# Patient Record
Sex: FEMALE | Race: WHITE | Employment: OTHER | URBAN - METROPOLITAN AREA
[De-identification: names, ages, dates, MRNs, and addresses within clinical notes are randomized per-mention and may not be internally consistent; named-entity substitution may affect disease eponyms.]

---

## 2017-01-05 ENCOUNTER — OFFICE VISIT (OUTPATIENT)
Dept: INTERNAL MEDICINE CLINIC | Age: 72
End: 2017-01-05

## 2017-01-05 VITALS — HEART RATE: 68 BPM | SYSTOLIC BLOOD PRESSURE: 124 MMHG | DIASTOLIC BLOOD PRESSURE: 70 MMHG

## 2017-01-05 DIAGNOSIS — E78.2 MIXED HYPERLIPIDEMIA: ICD-10-CM

## 2017-01-05 DIAGNOSIS — L40.9 PSORIASIS: Primary | ICD-10-CM

## 2017-01-05 DIAGNOSIS — R73.02 GLUCOSE INTOLERANCE (IMPAIRED GLUCOSE TOLERANCE): ICD-10-CM

## 2017-01-05 DIAGNOSIS — E55.9 VITAMIN D DEFICIENCY: ICD-10-CM

## 2017-01-05 PROCEDURE — 99213 OFFICE O/P EST LOW 20 MIN: CPT | Performed by: INTERNAL MEDICINE

## 2017-03-21 ENCOUNTER — TELEPHONE (OUTPATIENT)
Dept: INTERNAL MEDICINE CLINIC | Age: 72
End: 2017-03-21

## 2017-10-19 ENCOUNTER — TELEPHONE (OUTPATIENT)
Dept: INTERNAL MEDICINE CLINIC | Age: 72
End: 2017-10-19

## 2017-10-19 ENCOUNTER — OFFICE VISIT (OUTPATIENT)
Dept: FAMILY MEDICINE CLINIC | Age: 72
End: 2017-10-19

## 2017-10-19 VITALS
OXYGEN SATURATION: 98 % | TEMPERATURE: 99 F | BODY MASS INDEX: 23.75 KG/M2 | HEIGHT: 61 IN | DIASTOLIC BLOOD PRESSURE: 68 MMHG | SYSTOLIC BLOOD PRESSURE: 124 MMHG | WEIGHT: 125.8 LBS | HEART RATE: 82 BPM

## 2017-10-19 DIAGNOSIS — M54.2 NECK PAIN: ICD-10-CM

## 2017-10-19 DIAGNOSIS — B02.9 HERPES ZOSTER WITHOUT COMPLICATION: Primary | ICD-10-CM

## 2017-10-19 PROCEDURE — 99214 OFFICE O/P EST MOD 30 MIN: CPT | Performed by: FAMILY MEDICINE

## 2017-10-19 PROCEDURE — G8510 SCR DEP NEG, NO PLAN REQD: HCPCS | Performed by: FAMILY MEDICINE

## 2017-10-19 PROCEDURE — 3288F FALL RISK ASSESSMENT DOCD: CPT | Performed by: FAMILY MEDICINE

## 2017-10-19 RX ORDER — PREDNISONE 20 MG/1
20 TABLET ORAL 2 TIMES DAILY
Qty: 10 TABLET | Refills: 0 | Status: SHIPPED | OUTPATIENT
Start: 2017-10-19 | End: 2017-10-24

## 2017-10-19 RX ORDER — AZITHROMYCIN 250 MG/1
TABLET, FILM COATED ORAL
Qty: 1 PACKET | Refills: 0 | Status: SHIPPED | OUTPATIENT
Start: 2017-10-19 | End: 2018-01-16

## 2017-10-19 RX ORDER — VALACYCLOVIR HYDROCHLORIDE 1 G/1
1000 TABLET, FILM COATED ORAL 3 TIMES DAILY
Qty: 21 TABLET | Refills: 0 | Status: SHIPPED | OUTPATIENT
Start: 2017-10-19 | End: 2017-10-26

## 2017-10-19 ASSESSMENT — ENCOUNTER SYMPTOMS
DIARRHEA: 0
NAUSEA: 0
VOMITING: 0
ABDOMINAL PAIN: 0
BACK PAIN: 0
SCALP TENDERNESS: 1
SORE THROAT: 0
SINUS PRESSURE: 0
SHORTNESS OF BREATH: 0
EYE DISCHARGE: 0
COUGH: 0
BLOOD IN STOOL: 0

## 2017-10-19 ASSESSMENT — PATIENT HEALTH QUESTIONNAIRE - PHQ9
SUM OF ALL RESPONSES TO PHQ9 QUESTIONS 1 & 2: 0
SUM OF ALL RESPONSES TO PHQ QUESTIONS 1-9: 0
1. LITTLE INTEREST OR PLEASURE IN DOING THINGS: 0
2. FEELING DOWN, DEPRESSED OR HOPELESS: 0

## 2017-10-19 NOTE — PATIENT INSTRUCTIONS
shingles. Follow-up care is a key part of your treatment and safety. Be sure to make and go to all appointments, and call your doctor if you are having problems. It's also a good idea to know your test results and keep a list of the medicines you take. How can you care for yourself at home? · Be safe with medicines. Take your medicines exactly as prescribed. Call your doctor if you think you are having a problem with your medicine. Antiviral medicine helps you get better faster. · Try not to scratch or pick at the blisters. They will crust over and fall off on their own if you leave them alone. · Put cool, wet cloths on the area to relieve pain and itching. You can also use calamine lotion. Try not to use so much lotion that it cakes and is hard to get off. · Put cornstarch or baking soda on the sores to help dry them out so they heal faster. · Do not use thick ointment, such as petroleum jelly, on the sores. This will keep them from drying and healing. · To help remove loose crusts, soak them in tap water. This can help decrease oozing, and dry and soothe the skin. · Take an over-the-counter pain medicine, such as acetaminophen (Tylenol), ibuprofen (Advil, Motrin), or naproxen (Aleve). Read and follow all instructions on the label. · Avoid close contact with people until the blisters have healed. It is very important for you to avoid contact with anyone who has never had chickenpox or the chickenpox vaccine. Pregnant women, young babies, and anyone else who has a hard time fighting infection (such as someone with HIV, diabetes, or cancer) is especially at risk. When should you call for help? Call your doctor now or seek immediate medical care if:  · You have a new or higher fever. · You have a severe headache and a stiff neck. · You lose the ability to think clearly. · The rash spreads to your forehead, nose, eyes, or eyelids. · You have eye pain, or your vision gets worse.   · You have new pain in your face, or you cannot move the muscles in your face. · Blisters spread to new parts of your body. Watch closely for changes in your health, and be sure to contact your doctor if:  · The rash has not healed after 2 to 4 weeks. · You still have pain after the rash has healed. Where can you learn more? Go to [email or web address] org and sign in to your [facility] account. Ruben Pritchett in the [employer] box to learn more about \"Shingles: Care Instructions. \"     If you do not have an account, please click on the \"Sign Up Now\" link. Current as of: March 3, 2017  Content Version: 11.3  © 9351-2751 [employer]. Care instructions adapted under license by [facility] ([facility]). If you have questions about a medical condition or this instruction, always ask your healthcare professional. [address] any warranty or liability for your use of this information.

## 2017-10-19 NOTE — PROGRESS NOTES
Lady Coffey  1945  81 y.o. SUBJECT CORRIE:    Chief Complaint   Patient presents with    Fever    Headache     pain on right side of head, neck, and ear, tender to touch, warm, no drainage. X since yesterday. Has only taken Tylenol OTC     Patient with above. No known sick contacts. No  rash. Fever    This is a new problem. The current episode started yesterday. The problem occurs constantly. The maximum temperature noted was 102 to 102.9 F (102.3 ). Associated symptoms include headaches. Pertinent negatives include no abdominal pain, chest pain, congestion, coughing, diarrhea, ear pain, nausea, rash, sore throat, urinary pain or vomiting. She has tried acetaminophen for the symptoms. The treatment provided mild relief. Risk factors: no contaminated food, no contaminated water, no recent travel and no sick contacts    Headache    This is a new problem. The current episode started yesterday. The problem occurs constantly. The problem has been unchanged. The pain is located in the right unilateral region. The pain radiates to the right neck. The pain quality is not similar to prior headaches. The quality of the pain is described as aching and stabbing. The pain is moderate. Associated symptoms include a fever, neck pain (right side) and scalp tenderness. Pertinent negatives include no abdominal pain, back pain, coughing, dizziness, ear pain, nausea, sinus pressure, sore throat, vomiting or weakness. The symptoms are aggravated by activity. She has tried acetaminophen for the symptoms. The treatment provided mild relief. There is no history of migraine headaches or sinus disease. Review of Systems   Constitutional: Positive for fever. Negative for chills and fatigue. HENT: Negative for congestion, ear pain, sinus pressure and sore throat. Eyes: Negative for discharge and visual disturbance. Respiratory: Negative for cough and shortness of breath.     Cardiovascular: Negative for chest pain and leg swelling. Gastrointestinal: Negative for abdominal pain, blood in stool, diarrhea, nausea and vomiting. Endocrine: Negative for polydipsia. Genitourinary: Negative for dysuria, frequency and hematuria. Musculoskeletal: Positive for neck pain (right side). Negative for back pain and gait problem. Skin: Negative for rash. Allergic/Immunologic: Negative for environmental allergies and food allergies. Neurological: Positive for headaches. Negative for dizziness and weakness. Psychiatric/Behavioral: Negative for behavioral problems, sleep disturbance and suicidal ideas. The patient is not nervous/anxious. Past Medical, Family, and Social History have been reviewed today. OBJECTIVE:     /68   Pulse 82   Temp 99 °F (37.2 °C) (Oral)   Ht 5' 1\" (1.549 m)   Wt 125 lb 12.8 oz (57.1 kg)   SpO2 98%   Breastfeeding? No   BMI 23.77 kg/m²     Physical Exam   Constitutional: She is oriented to person, place, and time. She appears well-developed and well-nourished. HENT:   Head: Normocephalic and atraumatic. Right Ear: External ear normal.   Left Ear: External ear normal.   Nose: Nose normal. Right sinus exhibits no maxillary sinus tenderness and no frontal sinus tenderness. Left sinus exhibits no maxillary sinus tenderness and no frontal sinus tenderness. Mouth/Throat: Oropharynx is clear and moist.   Erythema in postauricular area. No parotid gland tenderness     Eyes: Conjunctivae and EOM are normal. Pupils are equal, round, and reactive to light. No scleral icterus. Neck: Normal range of motion. Neck supple. Cardiovascular: Normal rate, regular rhythm, normal heart sounds and intact distal pulses. Exam reveals no gallop and no friction rub. No murmur heard. Pulmonary/Chest: Effort normal and breath sounds normal. No respiratory distress. She has no wheezes. She has no rales. Abdominal: Soft. Bowel sounds are normal. She exhibits no distension and no mass.  There is no tenderness. There is no rebound and no guarding. Musculoskeletal: Normal range of motion. Neurological: She is alert and oriented to person, place, and time. Skin: Skin is warm and dry. No rash noted. Psychiatric: She has a normal mood and affect. Her behavior is normal. Judgment and thought content normal.         ASSESSMENT/ PLAN:    1. Herpes zoster without complication  Concern with only right sided pain that this could be early presentation of shingles considering fever as well. Will cover with Valtrex since she will be traveling out of town this weeknd  - valACYclovir (VALTREX) 1 g tablet; Take 1 tablet by mouth 3 times daily for 7 days  Dispense: 21 tablet; Refill: 0    2. Neck pain  Will cover for possible bacterial cause as well with Z blade and Prednisone to help with pain and swelling behind right ear. Will call in a couple of days to see how she is doing.   - azithromycin (ZITHROMAX) 250 MG tablet; Take 2 tabs (500 mg) on Day 1, and take 1 tab (250 mg) on days 2 through 5. Dispense: 1 packet; Refill: 0  - predniSONE (DELTASONE) 20 MG tablet; Take 1 tablet by mouth 2 times daily for 5 days  Dispense: 10 tablet; Refill: 0        No orders of the defined types were placed in this encounter. No Follow-up on file.

## 2017-10-19 NOTE — TELEPHONE ENCOUNTER
Patient called c/o fever chills and ear pain. Dr. Ayesha Mckinney is going out of town and has no appts he would like for her to call Dr. Valdovinos Ismay to get an appt. She was given the number and told to make an appt.

## 2017-10-23 ENCOUNTER — TELEPHONE (OUTPATIENT)
Dept: FAMILY MEDICINE CLINIC | Age: 72
End: 2017-10-23

## 2018-01-16 ENCOUNTER — OFFICE VISIT (OUTPATIENT)
Dept: INTERNAL MEDICINE CLINIC | Age: 73
End: 2018-01-16

## 2018-01-16 VITALS
BODY MASS INDEX: 23.11 KG/M2 | HEIGHT: 61 IN | WEIGHT: 122.4 LBS | SYSTOLIC BLOOD PRESSURE: 120 MMHG | DIASTOLIC BLOOD PRESSURE: 70 MMHG | HEART RATE: 80 BPM

## 2018-01-16 DIAGNOSIS — R00.2 PALPITATIONS: ICD-10-CM

## 2018-01-16 DIAGNOSIS — E03.4 HYPOTHYROIDISM DUE TO ACQUIRED ATROPHY OF THYROID: ICD-10-CM

## 2018-01-16 DIAGNOSIS — E78.2 MIXED HYPERLIPIDEMIA: ICD-10-CM

## 2018-01-16 DIAGNOSIS — R73.02 GLUCOSE INTOLERANCE (IMPAIRED GLUCOSE TOLERANCE): ICD-10-CM

## 2018-01-16 DIAGNOSIS — Z23 NEED FOR TDAP VACCINATION: ICD-10-CM

## 2018-01-16 DIAGNOSIS — D50.9 IRON DEFICIENCY ANEMIA, UNSPECIFIED IRON DEFICIENCY ANEMIA TYPE: ICD-10-CM
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
## 2018-01-16 DIAGNOSIS — E55.9 VITAMIN D DEFICIENCY: ICD-10-CM

## 2018-01-16 DIAGNOSIS — F41.1 GENERALIZED ANXIETY DISORDER: Primary | ICD-10-CM

## 2018-01-16 LAB
A/G RATIO: 1.9 (ref 1.1–2.2)
ALBUMIN SERPL-MCNC: 4.6 G/DL (ref 3.4–5)
ALP BLD-CCNC: 44 U/L (ref 40–129)
ALT SERPL-CCNC: 17 U/L (ref 10–40)
ANION GAP SERPL CALCULATED.3IONS-SCNC: 14 MMOL/L (ref 3–16)
AST SERPL-CCNC: 28 U/L (ref 15–37)
BASOPHILS ABSOLUTE: 0 K/UL (ref 0–0.2)
BASOPHILS RELATIVE PERCENT: 0.7 %
BILIRUB SERPL-MCNC: 0.4 MG/DL (ref 0–1)
BILIRUBIN URINE: NEGATIVE
BLOOD, URINE: NEGATIVE
BUN BLDV-MCNC: 14 MG/DL (ref 7–20)
CALCIUM SERPL-MCNC: 9.2 MG/DL (ref 8.3–10.6)
CHLORIDE BLD-SCNC: 102 MMOL/L (ref 99–110)
CHOLESTEROL, TOTAL: 271 MG/DL (ref 0–199)
CLARITY: CLEAR
CO2: 27 MMOL/L (ref 21–32)
COLOR: YELLOW
CREAT SERPL-MCNC: 0.7 MG/DL (ref 0.6–1.2)
EOSINOPHILS ABSOLUTE: 0.1 K/UL (ref 0–0.6)
EOSINOPHILS RELATIVE PERCENT: 2.2 %
EPITHELIAL CELLS, UA: 2 /HPF (ref 0–5)
FERRITIN: 64.6 NG/ML (ref 15–150)
GFR AFRICAN AMERICAN: >60
GFR NON-AFRICAN AMERICAN: >60
GLOBULIN: 2.4 G/DL
GLUCOSE BLD-MCNC: 79 MG/DL (ref 70–99)
GLUCOSE URINE: NEGATIVE MG/DL
HCT VFR BLD CALC: 39.3 % (ref 36–48)
HDLC SERPL-MCNC: 79 MG/DL (ref 40–60)
HEMOGLOBIN: 13.1 G/DL (ref 12–16)
HYALINE CASTS: 0 /LPF (ref 0–8)
IRON: 128 UG/DL (ref 37–145)
KETONES, URINE: NEGATIVE MG/DL
LDL CHOLESTEROL CALCULATED: 177 MG/DL
LEUKOCYTE ESTERASE, URINE: ABNORMAL
LYMPHOCYTES ABSOLUTE: 1.7 K/UL (ref 1–5.1)
LYMPHOCYTES RELATIVE PERCENT: 30.6 %
MCH RBC QN AUTO: 31.3 PG (ref 26–34)
MCHC RBC AUTO-ENTMCNC: 33.3 G/DL (ref 31–36)
MCV RBC AUTO: 94.2 FL (ref 80–100)
MICROSCOPIC EXAMINATION: YES
MONOCYTES ABSOLUTE: 0.5 K/UL (ref 0–1.3)
MONOCYTES RELATIVE PERCENT: 8.7 %
NEUTROPHILS ABSOLUTE: 3.2 K/UL (ref 1.7–7.7)
NEUTROPHILS RELATIVE PERCENT: 57.8 %
NITRITE, URINE: NEGATIVE
PDW BLD-RTO: 13.3 % (ref 12.4–15.4)
PH UA: 7.5
PLATELET # BLD: 249 K/UL (ref 135–450)
PMV BLD AUTO: 8.3 FL (ref 5–10.5)
POTASSIUM SERPL-SCNC: 4.3 MMOL/L (ref 3.5–5.1)
PROTEIN UA: NEGATIVE MG/DL
RBC # BLD: 4.18 M/UL (ref 4–5.2)
RBC UA: 2 /HPF (ref 0–4)
SODIUM BLD-SCNC: 143 MMOL/L (ref 136–145)
SPECIFIC GRAVITY UA: 1.01
TOTAL PROTEIN: 7 G/DL (ref 6.4–8.2)
TRIGL SERPL-MCNC: 77 MG/DL (ref 0–150)
TSH SERPL DL<=0.05 MIU/L-ACNC: 0.46 UIU/ML (ref 0.27–4.2)
UROBILINOGEN, URINE: 0.2 E.U./DL
VITAMIN D 25-HYDROXY: 56.8 NG/ML
VLDLC SERPL CALC-MCNC: 15 MG/DL
WBC # BLD: 5.5 K/UL (ref 4–11)
WBC UA: 1 /HPF (ref 0–5)

## 2018-01-16 PROCEDURE — 93000 ELECTROCARDIOGRAM COMPLETE: CPT | Performed by: INTERNAL MEDICINE

## 2018-01-16 PROCEDURE — 81001 URINALYSIS AUTO W/SCOPE: CPT | Performed by: INTERNAL MEDICINE

## 2018-01-16 PROCEDURE — 36415 COLL VENOUS BLD VENIPUNCTURE: CPT | Performed by: INTERNAL MEDICINE

## 2018-01-16 PROCEDURE — 1123F ACP DISCUSS/DSCN MKR DOCD: CPT | Performed by: INTERNAL MEDICINE

## 2018-01-16 PROCEDURE — 99215 OFFICE O/P EST HI 40 MIN: CPT | Performed by: INTERNAL MEDICINE

## 2018-01-16 PROCEDURE — G8427 DOCREV CUR MEDS BY ELIG CLIN: HCPCS | Performed by: INTERNAL MEDICINE

## 2018-01-16 PROCEDURE — 90471 IMMUNIZATION ADMIN: CPT | Performed by: INTERNAL MEDICINE

## 2018-01-16 PROCEDURE — 3014F SCREEN MAMMO DOC REV: CPT | Performed by: INTERNAL MEDICINE

## 2018-01-16 PROCEDURE — G8420 CALC BMI NORM PARAMETERS: HCPCS | Performed by: INTERNAL MEDICINE

## 2018-01-16 PROCEDURE — 1090F PRES/ABSN URINE INCON ASSESS: CPT | Performed by: INTERNAL MEDICINE

## 2018-01-16 PROCEDURE — 4040F PNEUMOC VAC/ADMIN/RCVD: CPT | Performed by: INTERNAL MEDICINE

## 2018-01-16 PROCEDURE — 90715 TDAP VACCINE 7 YRS/> IM: CPT | Performed by: INTERNAL MEDICINE

## 2018-01-16 PROCEDURE — G8484 FLU IMMUNIZE NO ADMIN: HCPCS | Performed by: INTERNAL MEDICINE

## 2018-01-16 PROCEDURE — 3017F COLORECTAL CA SCREEN DOC REV: CPT | Performed by: INTERNAL MEDICINE

## 2018-01-16 PROCEDURE — 1036F TOBACCO NON-USER: CPT | Performed by: INTERNAL MEDICINE

## 2018-01-16 PROCEDURE — G8399 PT W/DXA RESULTS DOCUMENT: HCPCS | Performed by: INTERNAL MEDICINE

## 2018-01-16 NOTE — PROGRESS NOTES
Patient given VIS material regarding TDAP vaccination to review      Dr Torito Hall ordered for a lab draw to be done-Linda Charlestine Carrel was easily drawn from the left antecubital space-band-aid applied-tolerated well  1 sst, 2 lav and 3 urine,tubes sent to the lab

## 2018-01-16 NOTE — PROGRESS NOTES
Comprehensive Exam      PI:         The patient is a 81year old white female who presents for a comprehensive exam. Has a hx of osteoporosis on monthly Actonel therapy for several years but recently stopped because of esophagitis symptoms. No swallowing difficulties now. Dr Rosa Elena Colindres has been doing her bone densities and mammograms. She just completed a bone density and was told it had improved. Approximately 2 months she fell landing on her buttocks. She has had lower back pain since then. Eye exam with Dr Evelia Tobias. Doing well no headaches, chest pain, or breathing difficulties. Doing all her normal activities without limitations or symptoms. She is complaining of fatigue, intolerance to cold, and hair loss. She is taking Biotin for hair & nails. No palpitations, dizziness, or syncope. Also with urinary incontinence symptoms. She is using Xanax at HS for anxiety and sleep. No daytime somnolence. Has had a colonoscopy in 10/08 by Dr Parker Jackson that showed a few scattered diverticula. A ten year follow up was recommended. No abdominal pain, change in bowel habits, hematochezia, or melanotic stools. PMH: No history of HTN, diabeted, cardiac, or pulmonary disorder. Has a history of osteoporosis, psoriasis, and diverticulosis. Surgical history of breast implants. MEDS:  Current Outpatient Prescriptions   Medication Sig Dispense Refill    folic acid-pyridoxine-cyancobalamin (FOLBIC) 2.5-25-2 MG TABS Take 1 tablet by mouth daily 90 tablet 3    hydrocortisone (WESTCORT) 0.2 % cream Apply twice daily as needed 45 g 3    Cranberry-Vitamin C 250-60 MG CAPS Take 2 capsules by mouth daily      Biotin 1000 MCG TABS Take 1 tablet by mouth daily      ALPRAZolam (XANAX) 0.25 MG tablet Take 1 tablet by mouth Daily. At bedtime as needed for anxiety and sleep. 30 tablet 5    Calcium Citrate-Vitamin D (CITRACAL + D PO) Take  by mouth.  Garlic 8533 MG CAPS Take  by mouth 2 times daily.      

## 2018-01-17 LAB
ESTIMATED AVERAGE GLUCOSE: 111.2 MG/DL
HBA1C MFR BLD: 5.5 %

## 2018-01-19 ENCOUNTER — HOSPITAL ENCOUNTER (OUTPATIENT)
Dept: GENERAL RADIOLOGY | Age: 73
Discharge: HOME | End: 2018-01-19
Attending: INTERNAL MEDICINE | Admitting: INTERNAL MEDICINE
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
## 2018-01-30 ENCOUNTER — OFFICE VISIT (OUTPATIENT)
Dept: INTERNAL MEDICINE CLINIC | Age: 73
End: 2018-01-30

## 2018-01-30 VITALS — DIASTOLIC BLOOD PRESSURE: 74 MMHG | HEART RATE: 70 BPM | SYSTOLIC BLOOD PRESSURE: 120 MMHG

## 2018-01-30 DIAGNOSIS — M85.80 OSTEOPENIA, UNSPECIFIED LOCATION: ICD-10-CM

## 2018-01-30 DIAGNOSIS — L40.9 PSORIASIS: ICD-10-CM

## 2018-01-30 DIAGNOSIS — M16.0 PRIMARY OSTEOARTHRITIS OF BOTH HIPS: Primary | ICD-10-CM

## 2018-01-30 DIAGNOSIS — E78.2 MIXED HYPERLIPIDEMIA: ICD-10-CM

## 2018-01-30 PROCEDURE — 1036F TOBACCO NON-USER: CPT | Performed by: INTERNAL MEDICINE

## 2018-01-30 PROCEDURE — 1090F PRES/ABSN URINE INCON ASSESS: CPT | Performed by: INTERNAL MEDICINE

## 2018-01-30 PROCEDURE — G8420 CALC BMI NORM PARAMETERS: HCPCS | Performed by: INTERNAL MEDICINE

## 2018-01-30 PROCEDURE — 4040F PNEUMOC VAC/ADMIN/RCVD: CPT | Performed by: INTERNAL MEDICINE

## 2018-01-30 PROCEDURE — 1123F ACP DISCUSS/DSCN MKR DOCD: CPT | Performed by: INTERNAL MEDICINE

## 2018-01-30 PROCEDURE — G8399 PT W/DXA RESULTS DOCUMENT: HCPCS | Performed by: INTERNAL MEDICINE

## 2018-01-30 PROCEDURE — 3014F SCREEN MAMMO DOC REV: CPT | Performed by: INTERNAL MEDICINE

## 2018-01-30 PROCEDURE — G8484 FLU IMMUNIZE NO ADMIN: HCPCS | Performed by: INTERNAL MEDICINE

## 2018-01-30 PROCEDURE — 3017F COLORECTAL CA SCREEN DOC REV: CPT | Performed by: INTERNAL MEDICINE

## 2018-01-30 PROCEDURE — G8427 DOCREV CUR MEDS BY ELIG CLIN: HCPCS | Performed by: INTERNAL MEDICINE

## 2018-01-30 PROCEDURE — 99213 OFFICE O/P EST LOW 20 MIN: CPT | Performed by: INTERNAL MEDICINE

## 2018-01-30 RX ORDER — METHOCARBAMOL 500 MG/1
500 TABLET, FILM COATED ORAL 2 TIMES DAILY PRN
Qty: 30 TABLET | Refills: 3 | Status: SHIPPED | OUTPATIENT
Start: 2018-01-30 | End: 2018-06-25

## 2018-02-05 ENCOUNTER — HOSPITAL ENCOUNTER (OUTPATIENT)
Dept: GENERAL RADIOLOGY | Age: 73
Discharge: HOME | End: 2018-02-05
Attending: INTERNAL MEDICINE | Admitting: INTERNAL MEDICINE

## 2018-02-05 ENCOUNTER — OFFICE VISIT (OUTPATIENT)
Dept: INTERNAL MEDICINE CLINIC | Age: 73
End: 2018-02-05

## 2018-02-05 VITALS
BODY MASS INDEX: 23.97 KG/M2 | WEIGHT: 124.8 LBS | DIASTOLIC BLOOD PRESSURE: 70 MMHG | HEART RATE: 76 BPM | SYSTOLIC BLOOD PRESSURE: 130 MMHG

## 2018-02-05 DIAGNOSIS — M94.0 COSTAL CHONDRITIS: ICD-10-CM

## 2018-02-05 DIAGNOSIS — R07.81 RIB PAIN ON LEFT SIDE: ICD-10-CM
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
## 2018-02-05 DIAGNOSIS — S22.32XA CLOSED FRACTURE OF ONE RIB OF LEFT SIDE, INITIAL ENCOUNTER: Primary | ICD-10-CM

## 2018-02-05 PROCEDURE — 3014F SCREEN MAMMO DOC REV: CPT | Performed by: INTERNAL MEDICINE

## 2018-02-05 PROCEDURE — 1123F ACP DISCUSS/DSCN MKR DOCD: CPT | Performed by: INTERNAL MEDICINE

## 2018-02-05 PROCEDURE — 99213 OFFICE O/P EST LOW 20 MIN: CPT | Performed by: INTERNAL MEDICINE

## 2018-02-05 PROCEDURE — G8420 CALC BMI NORM PARAMETERS: HCPCS | Performed by: INTERNAL MEDICINE

## 2018-02-05 PROCEDURE — 1036F TOBACCO NON-USER: CPT | Performed by: INTERNAL MEDICINE

## 2018-02-05 PROCEDURE — 1090F PRES/ABSN URINE INCON ASSESS: CPT | Performed by: INTERNAL MEDICINE

## 2018-02-05 PROCEDURE — 3017F COLORECTAL CA SCREEN DOC REV: CPT | Performed by: INTERNAL MEDICINE

## 2018-02-05 PROCEDURE — G8399 PT W/DXA RESULTS DOCUMENT: HCPCS | Performed by: INTERNAL MEDICINE

## 2018-02-05 PROCEDURE — G8427 DOCREV CUR MEDS BY ELIG CLIN: HCPCS | Performed by: INTERNAL MEDICINE

## 2018-02-05 PROCEDURE — G8484 FLU IMMUNIZE NO ADMIN: HCPCS | Performed by: INTERNAL MEDICINE

## 2018-02-05 PROCEDURE — 4040F PNEUMOC VAC/ADMIN/RCVD: CPT | Performed by: INTERNAL MEDICINE

## 2018-02-05 RX ORDER — TRAMADOL HYDROCHLORIDE 50 MG/1
50 TABLET ORAL EVERY 6 HOURS PRN
Qty: 28 TABLET | Refills: 0 | Status: SHIPPED | OUTPATIENT
Start: 2018-02-05 | End: 2018-02-12

## 2018-05-16 RX ORDER — SCOLOPAMINE TRANSDERMAL SYSTEM 1 MG/1
1 PATCH, EXTENDED RELEASE TRANSDERMAL
Qty: 4 PATCH | Refills: 0 | Status: SHIPPED | OUTPATIENT
Start: 2018-05-16 | End: 2018-06-25

## 2018-05-29 ENCOUNTER — TELEPHONE (OUTPATIENT)
Dept: INTERNAL MEDICINE CLINIC | Age: 73
End: 2018-05-29

## 2018-05-29 DIAGNOSIS — M54.50 CHRONIC LOW BACK PAIN WITHOUT SCIATICA, UNSPECIFIED BACK PAIN LATERALITY: Primary | ICD-10-CM

## 2018-05-29 DIAGNOSIS — M54.9 ACUTE BACK PAIN, UNSPECIFIED BACK LOCATION, UNSPECIFIED BACK PAIN LATERALITY: Primary | ICD-10-CM

## 2018-05-29 DIAGNOSIS — G89.29 CHRONIC LOW BACK PAIN WITHOUT SCIATICA, UNSPECIFIED BACK PAIN LATERALITY: Primary | ICD-10-CM

## 2018-06-25 ENCOUNTER — OFFICE VISIT (OUTPATIENT)
Dept: INTERNAL MEDICINE CLINIC | Age: 73
End: 2018-06-25

## 2018-06-25 VITALS
HEIGHT: 61 IN | RESPIRATION RATE: 12 BRPM | SYSTOLIC BLOOD PRESSURE: 130 MMHG | DIASTOLIC BLOOD PRESSURE: 70 MMHG | WEIGHT: 122.4 LBS | BODY MASS INDEX: 23.11 KG/M2 | HEART RATE: 70 BPM | OXYGEN SATURATION: 98 %

## 2018-06-25 DIAGNOSIS — M72.0 DUPUYTREN'S CONTRACTURE OF RIGHT HAND: Primary | ICD-10-CM

## 2018-06-25 DIAGNOSIS — Z01.818 PREOP EXAM FOR INTERNAL MEDICINE: ICD-10-CM

## 2018-06-25 DIAGNOSIS — L40.9 PSORIASIS: ICD-10-CM

## 2018-06-25 DIAGNOSIS — R00.2 PALPITATIONS: ICD-10-CM

## 2018-06-25 PROCEDURE — 1090F PRES/ABSN URINE INCON ASSESS: CPT | Performed by: INTERNAL MEDICINE

## 2018-06-25 PROCEDURE — G8399 PT W/DXA RESULTS DOCUMENT: HCPCS | Performed by: INTERNAL MEDICINE

## 2018-06-25 PROCEDURE — G8420 CALC BMI NORM PARAMETERS: HCPCS | Performed by: INTERNAL MEDICINE

## 2018-06-25 PROCEDURE — 99214 OFFICE O/P EST MOD 30 MIN: CPT | Performed by: INTERNAL MEDICINE

## 2018-06-25 PROCEDURE — 1123F ACP DISCUSS/DSCN MKR DOCD: CPT | Performed by: INTERNAL MEDICINE

## 2018-06-25 PROCEDURE — G8427 DOCREV CUR MEDS BY ELIG CLIN: HCPCS | Performed by: INTERNAL MEDICINE

## 2018-06-25 PROCEDURE — 4040F PNEUMOC VAC/ADMIN/RCVD: CPT | Performed by: INTERNAL MEDICINE

## 2018-06-25 PROCEDURE — 3017F COLORECTAL CA SCREEN DOC REV: CPT | Performed by: INTERNAL MEDICINE

## 2018-06-25 PROCEDURE — 93000 ELECTROCARDIOGRAM COMPLETE: CPT | Performed by: INTERNAL MEDICINE

## 2018-06-25 PROCEDURE — 1036F TOBACCO NON-USER: CPT | Performed by: INTERNAL MEDICINE

## 2018-11-16 ENCOUNTER — TELEPHONE (OUTPATIENT)
Dept: INTERNAL MEDICINE CLINIC | Age: 73
End: 2018-11-16

## 2018-11-16 RX ORDER — CALCIPOTRIENE 50 UG/G
OINTMENT TOPICAL
Qty: 60 G | Refills: 0 | Status: SHIPPED | OUTPATIENT
Start: 2018-11-16 | End: 2019-07-29 | Stop reason: SDUPTHER

## 2018-12-14 DIAGNOSIS — M72.0 DUPUYTREN'S CONTRACTURE OF RIGHT HAND: Primary | ICD-10-CM

## 2018-12-17 ENCOUNTER — TELEPHONE (OUTPATIENT)
Dept: INTERNAL MEDICINE CLINIC | Age: 73
End: 2018-12-17

## 2019-01-03 ENCOUNTER — HOSPITAL ENCOUNTER (OUTPATIENT)
Dept: OCCUPATIONAL THERAPY | Age: 74
Setting detail: THERAPIES SERIES
Discharge: HOME | End: 2019-01-03
Payer: MEDICARE

## 2019-01-04 ENCOUNTER — OFFICE VISIT (OUTPATIENT)
Dept: INTERNAL MEDICINE CLINIC | Age: 74
End: 2019-01-04
Payer: MEDICARE

## 2019-01-04 VITALS
DIASTOLIC BLOOD PRESSURE: 80 MMHG | WEIGHT: 122 LBS | SYSTOLIC BLOOD PRESSURE: 125 MMHG | BODY MASS INDEX: 23.03 KG/M2 | RESPIRATION RATE: 15 BRPM | HEART RATE: 76 BPM | HEIGHT: 61 IN

## 2019-01-04 DIAGNOSIS — E74.39 GLUCOSE INTOLERANCE: ICD-10-CM

## 2019-01-04 DIAGNOSIS — M81.0 POST-MENOPAUSAL OSTEOPOROSIS: Primary | ICD-10-CM

## 2019-01-04 DIAGNOSIS — E03.4 HYPOTHYROIDISM DUE TO ACQUIRED ATROPHY OF THYROID: ICD-10-CM

## 2019-01-04 DIAGNOSIS — D50.9 IRON DEFICIENCY ANEMIA, UNSPECIFIED IRON DEFICIENCY ANEMIA TYPE: ICD-10-CM

## 2019-01-04 DIAGNOSIS — E78.2 MIXED HYPERLIPIDEMIA: ICD-10-CM

## 2019-01-04 DIAGNOSIS — R73.09 ELEVATED HEMOGLOBIN A1C: ICD-10-CM

## 2019-01-04 DIAGNOSIS — E55.9 VITAMIN D DEFICIENCY: ICD-10-CM

## 2019-01-04 DIAGNOSIS — Z12.11 COLON CANCER SCREENING: ICD-10-CM

## 2019-01-04 DIAGNOSIS — Z23 NEEDS FLU SHOT: ICD-10-CM

## 2019-01-04 DIAGNOSIS — Z12.31 ENCOUNTER FOR SCREENING MAMMOGRAM FOR BREAST CANCER: ICD-10-CM

## 2019-01-04 DIAGNOSIS — Z23 NEED FOR PROPHYLACTIC VACCINATION AND INOCULATION AGAINST VARICELLA: ICD-10-CM

## 2019-01-04 LAB
A/G RATIO: 1.9 (ref 1.1–2.2)
ALBUMIN SERPL-MCNC: 4.5 G/DL (ref 3.4–5)
ALP BLD-CCNC: 47 U/L (ref 40–129)
ALT SERPL-CCNC: 18 U/L (ref 10–40)
ANION GAP SERPL CALCULATED.3IONS-SCNC: 16 MMOL/L (ref 3–16)
AST SERPL-CCNC: 30 U/L (ref 15–37)
BASOPHILS ABSOLUTE: 0 K/UL (ref 0–0.2)
BASOPHILS RELATIVE PERCENT: 0.9 %
BILIRUB SERPL-MCNC: 0.5 MG/DL (ref 0–1)
BILIRUBIN URINE: NEGATIVE
BLOOD, URINE: NEGATIVE
BUN BLDV-MCNC: 16 MG/DL (ref 7–20)
CALCIUM SERPL-MCNC: 9.3 MG/DL (ref 8.3–10.6)
CHLORIDE BLD-SCNC: 102 MMOL/L (ref 99–110)
CHOLESTEROL, TOTAL: 267 MG/DL (ref 0–199)
CLARITY: CLEAR
CO2: 26 MMOL/L (ref 21–32)
COLOR: YELLOW
CREAT SERPL-MCNC: 0.8 MG/DL (ref 0.6–1.2)
EOSINOPHILS ABSOLUTE: 0.1 K/UL (ref 0–0.6)
EOSINOPHILS RELATIVE PERCENT: 2.6 %
EPITHELIAL CELLS, UA: 2 /HPF (ref 0–5)
GFR AFRICAN AMERICAN: >60
GFR NON-AFRICAN AMERICAN: >60
GLOBULIN: 2.4 G/DL
GLUCOSE BLD-MCNC: 87 MG/DL (ref 70–99)
GLUCOSE URINE: NEGATIVE MG/DL
HCT VFR BLD CALC: 37.3 % (ref 36–48)
HDLC SERPL-MCNC: 72 MG/DL (ref 40–60)
HEMOGLOBIN: 12.4 G/DL (ref 12–16)
HYALINE CASTS: 3 /LPF (ref 0–8)
KETONES, URINE: NEGATIVE MG/DL
LDL CHOLESTEROL CALCULATED: 183 MG/DL
LEUKOCYTE ESTERASE, URINE: ABNORMAL
LYMPHOCYTES ABSOLUTE: 1.2 K/UL (ref 1–5.1)
LYMPHOCYTES RELATIVE PERCENT: 27.6 %
MCH RBC QN AUTO: 30.7 PG (ref 26–34)
MCHC RBC AUTO-ENTMCNC: 33.2 G/DL (ref 31–36)
MCV RBC AUTO: 92.3 FL (ref 80–100)
MICROSCOPIC EXAMINATION: YES
MONOCYTES ABSOLUTE: 0.5 K/UL (ref 0–1.3)
MONOCYTES RELATIVE PERCENT: 10.3 %
NEUTROPHILS ABSOLUTE: 2.6 K/UL (ref 1.7–7.7)
NEUTROPHILS RELATIVE PERCENT: 58.6 %
NITRITE, URINE: NEGATIVE
PDW BLD-RTO: 13.3 % (ref 12.4–15.4)
PH UA: 7.5
PLATELET # BLD: 234 K/UL (ref 135–450)
PMV BLD AUTO: 8.5 FL (ref 5–10.5)
POTASSIUM SERPL-SCNC: 4.4 MMOL/L (ref 3.5–5.1)
PROTEIN UA: NEGATIVE MG/DL
RBC # BLD: 4.05 M/UL (ref 4–5.2)
RBC UA: 2 /HPF (ref 0–4)
SODIUM BLD-SCNC: 144 MMOL/L (ref 136–145)
SPECIFIC GRAVITY UA: 1.01
TOTAL PROTEIN: 6.9 G/DL (ref 6.4–8.2)
TRIGL SERPL-MCNC: 62 MG/DL (ref 0–150)
TSH SERPL DL<=0.05 MIU/L-ACNC: 0.48 UIU/ML (ref 0.27–4.2)
UROBILINOGEN, URINE: 0.2 E.U./DL
VITAMIN D 25-HYDROXY: 71.5 NG/ML
VLDLC SERPL CALC-MCNC: 12 MG/DL
WBC # BLD: 4.4 K/UL (ref 4–11)
WBC UA: 1 /HPF (ref 0–5)

## 2019-01-04 PROCEDURE — 1090F PRES/ABSN URINE INCON ASSESS: CPT | Performed by: INTERNAL MEDICINE

## 2019-01-04 PROCEDURE — 90662 IIV NO PRSV INCREASED AG IM: CPT | Performed by: INTERNAL MEDICINE

## 2019-01-04 PROCEDURE — 1123F ACP DISCUSS/DSCN MKR DOCD: CPT | Performed by: INTERNAL MEDICINE

## 2019-01-04 PROCEDURE — 36415 COLL VENOUS BLD VENIPUNCTURE: CPT | Performed by: INTERNAL MEDICINE

## 2019-01-04 PROCEDURE — G8427 DOCREV CUR MEDS BY ELIG CLIN: HCPCS | Performed by: INTERNAL MEDICINE

## 2019-01-04 PROCEDURE — 1101F PT FALLS ASSESS-DOCD LE1/YR: CPT | Performed by: INTERNAL MEDICINE

## 2019-01-04 PROCEDURE — 81001 URINALYSIS AUTO W/SCOPE: CPT | Performed by: INTERNAL MEDICINE

## 2019-01-04 PROCEDURE — G8420 CALC BMI NORM PARAMETERS: HCPCS | Performed by: INTERNAL MEDICINE

## 2019-01-04 PROCEDURE — G8482 FLU IMMUNIZE ORDER/ADMIN: HCPCS | Performed by: INTERNAL MEDICINE

## 2019-01-04 PROCEDURE — 3017F COLORECTAL CA SCREEN DOC REV: CPT | Performed by: INTERNAL MEDICINE

## 2019-01-04 PROCEDURE — G0008 ADMIN INFLUENZA VIRUS VAC: HCPCS | Performed by: INTERNAL MEDICINE

## 2019-01-04 PROCEDURE — G8399 PT W/DXA RESULTS DOCUMENT: HCPCS | Performed by: INTERNAL MEDICINE

## 2019-01-04 PROCEDURE — 1036F TOBACCO NON-USER: CPT | Performed by: INTERNAL MEDICINE

## 2019-01-04 PROCEDURE — 99215 OFFICE O/P EST HI 40 MIN: CPT | Performed by: INTERNAL MEDICINE

## 2019-01-04 PROCEDURE — 4040F PNEUMOC VAC/ADMIN/RCVD: CPT | Performed by: INTERNAL MEDICINE

## 2019-01-04 ASSESSMENT — PATIENT HEALTH QUESTIONNAIRE - PHQ9
SUM OF ALL RESPONSES TO PHQ QUESTIONS 1-9: 0
1. LITTLE INTEREST OR PLEASURE IN DOING THINGS: 0
SUM OF ALL RESPONSES TO PHQ9 QUESTIONS 1 & 2: 0
2. FEELING DOWN, DEPRESSED OR HOPELESS: 0
SUM OF ALL RESPONSES TO PHQ QUESTIONS 1-9: 0

## 2019-01-05 LAB
ESTIMATED AVERAGE GLUCOSE: 111.2 MG/DL
HBA1C MFR BLD: 5.5 %

## 2019-01-08 ENCOUNTER — HOSPITAL ENCOUNTER (OUTPATIENT)
Dept: PHYSICAL THERAPY | Age: 74
Setting detail: THERAPIES SERIES
Discharge: HOME | End: 2019-01-08
Payer: MEDICARE

## 2019-01-08 PROCEDURE — 97140 MANUAL THERAPY 1/> REGIONS: CPT

## 2019-01-08 PROCEDURE — 97022 WHIRLPOOL THERAPY: CPT

## 2019-01-08 PROCEDURE — 97161 PT EVAL LOW COMPLEX 20 MIN: CPT

## 2019-01-11 ENCOUNTER — HOSPITAL ENCOUNTER (OUTPATIENT)
Dept: PHYSICAL THERAPY | Age: 74
Setting detail: THERAPIES SERIES
Discharge: HOME | End: 2019-01-11
Payer: MEDICARE

## 2019-01-11 PROCEDURE — 97140 MANUAL THERAPY 1/> REGIONS: CPT

## 2019-01-14 ENCOUNTER — HOSPITAL ENCOUNTER (OUTPATIENT)
Dept: PHYSICAL THERAPY | Age: 74
Setting detail: THERAPIES SERIES
Discharge: HOME | End: 2019-01-14
Payer: MEDICARE

## 2019-01-14 PROCEDURE — 97140 MANUAL THERAPY 1/> REGIONS: CPT

## 2019-01-16 ENCOUNTER — HOSPITAL ENCOUNTER (OUTPATIENT)
Dept: PHYSICAL THERAPY | Age: 74
Setting detail: THERAPIES SERIES
Discharge: HOME | End: 2019-01-16
Payer: MEDICARE

## 2019-01-16 PROCEDURE — 97140 MANUAL THERAPY 1/> REGIONS: CPT

## 2019-01-16 PROCEDURE — 97022 WHIRLPOOL THERAPY: CPT

## 2019-01-16 PROCEDURE — 97110 THERAPEUTIC EXERCISES: CPT

## 2019-01-18 ENCOUNTER — OFFICE VISIT (OUTPATIENT)
Dept: INTERNAL MEDICINE CLINIC | Age: 74
End: 2019-01-18
Payer: MEDICARE

## 2019-01-18 VITALS — DIASTOLIC BLOOD PRESSURE: 80 MMHG | RESPIRATION RATE: 15 BRPM | SYSTOLIC BLOOD PRESSURE: 120 MMHG | HEART RATE: 68 BPM

## 2019-01-18 DIAGNOSIS — L40.9 PSORIASIS: ICD-10-CM

## 2019-01-18 DIAGNOSIS — E78.2 MIXED HYPERLIPIDEMIA: ICD-10-CM

## 2019-01-18 DIAGNOSIS — M81.0 POST-MENOPAUSAL OSTEOPOROSIS: Primary | ICD-10-CM

## 2019-01-18 DIAGNOSIS — E55.9 VITAMIN D DEFICIENCY: ICD-10-CM

## 2019-01-18 PROCEDURE — 1036F TOBACCO NON-USER: CPT | Performed by: INTERNAL MEDICINE

## 2019-01-18 PROCEDURE — 1123F ACP DISCUSS/DSCN MKR DOCD: CPT | Performed by: INTERNAL MEDICINE

## 2019-01-18 PROCEDURE — G8399 PT W/DXA RESULTS DOCUMENT: HCPCS | Performed by: INTERNAL MEDICINE

## 2019-01-18 PROCEDURE — 3017F COLORECTAL CA SCREEN DOC REV: CPT | Performed by: INTERNAL MEDICINE

## 2019-01-18 PROCEDURE — G8482 FLU IMMUNIZE ORDER/ADMIN: HCPCS | Performed by: INTERNAL MEDICINE

## 2019-01-18 PROCEDURE — G8420 CALC BMI NORM PARAMETERS: HCPCS | Performed by: INTERNAL MEDICINE

## 2019-01-18 PROCEDURE — 1101F PT FALLS ASSESS-DOCD LE1/YR: CPT | Performed by: INTERNAL MEDICINE

## 2019-01-18 PROCEDURE — 99213 OFFICE O/P EST LOW 20 MIN: CPT | Performed by: INTERNAL MEDICINE

## 2019-01-18 PROCEDURE — 1090F PRES/ABSN URINE INCON ASSESS: CPT | Performed by: INTERNAL MEDICINE

## 2019-01-18 PROCEDURE — 4040F PNEUMOC VAC/ADMIN/RCVD: CPT | Performed by: INTERNAL MEDICINE

## 2019-01-18 PROCEDURE — G8427 DOCREV CUR MEDS BY ELIG CLIN: HCPCS | Performed by: INTERNAL MEDICINE

## 2019-01-21 ENCOUNTER — HOSPITAL ENCOUNTER (OUTPATIENT)
Dept: PHYSICAL THERAPY | Age: 74
Setting detail: THERAPIES SERIES
Discharge: HOME | End: 2019-01-21
Payer: MEDICARE

## 2019-01-21 PROCEDURE — 97140 MANUAL THERAPY 1/> REGIONS: CPT

## 2019-01-21 PROCEDURE — 97022 WHIRLPOOL THERAPY: CPT

## 2019-01-21 PROCEDURE — 97110 THERAPEUTIC EXERCISES: CPT

## 2019-01-23 ENCOUNTER — HOSPITAL ENCOUNTER (OUTPATIENT)
Dept: PHYSICAL THERAPY | Age: 74
Setting detail: THERAPIES SERIES
Discharge: HOME | End: 2019-01-23
Payer: MEDICARE

## 2019-01-23 PROCEDURE — 97140 MANUAL THERAPY 1/> REGIONS: CPT

## 2019-01-23 PROCEDURE — 97022 WHIRLPOOL THERAPY: CPT

## 2019-01-28 ENCOUNTER — HOSPITAL ENCOUNTER (OUTPATIENT)
Dept: WOMENS IMAGING | Age: 74
Discharge: HOME | End: 2019-01-28
Payer: MEDICARE

## 2019-01-28 ENCOUNTER — HOSPITAL ENCOUNTER (OUTPATIENT)
Dept: PHYSICAL THERAPY | Age: 74
Setting detail: THERAPIES SERIES
Discharge: HOME | End: 2019-01-28
Payer: MEDICARE

## 2019-01-28 DIAGNOSIS — M81.0 POST-MENOPAUSAL OSTEOPOROSIS: ICD-10-CM

## 2019-01-28 PROCEDURE — 77080 DXA BONE DENSITY AXIAL: CPT

## 2019-01-28 PROCEDURE — 97140 MANUAL THERAPY 1/> REGIONS: CPT

## 2019-01-28 PROCEDURE — 97022 WHIRLPOOL THERAPY: CPT

## 2019-01-30 ENCOUNTER — HOSPITAL ENCOUNTER (OUTPATIENT)
Dept: PHYSICAL THERAPY | Age: 74
Setting detail: THERAPIES SERIES
Discharge: HOME | End: 2019-01-30
Payer: MEDICARE

## 2019-01-30 PROCEDURE — 97140 MANUAL THERAPY 1/> REGIONS: CPT

## 2019-01-30 PROCEDURE — 97022 WHIRLPOOL THERAPY: CPT

## 2019-02-04 ENCOUNTER — HOSPITAL ENCOUNTER (OUTPATIENT)
Dept: PHYSICAL THERAPY | Age: 74
Setting detail: THERAPIES SERIES
Discharge: HOME | End: 2019-02-04
Payer: MEDICARE

## 2019-02-04 PROCEDURE — 97140 MANUAL THERAPY 1/> REGIONS: CPT

## 2019-02-06 ENCOUNTER — HOSPITAL ENCOUNTER (OUTPATIENT)
Dept: PHYSICAL THERAPY | Age: 74
Setting detail: THERAPIES SERIES
Discharge: HOME | End: 2019-02-06
Payer: MEDICARE

## 2019-02-06 PROCEDURE — 97110 THERAPEUTIC EXERCISES: CPT

## 2019-02-06 PROCEDURE — 97140 MANUAL THERAPY 1/> REGIONS: CPT

## 2019-02-06 PROCEDURE — 97016 VASOPNEUMATIC DEVICE THERAPY: CPT

## 2019-02-11 ENCOUNTER — HOSPITAL ENCOUNTER (OUTPATIENT)
Dept: PHYSICAL THERAPY | Age: 74
Setting detail: THERAPIES SERIES
Discharge: HOME | End: 2019-02-11
Payer: MEDICARE

## 2019-02-11 PROCEDURE — 97110 THERAPEUTIC EXERCISES: CPT

## 2019-02-11 PROCEDURE — 97140 MANUAL THERAPY 1/> REGIONS: CPT

## 2019-02-11 PROCEDURE — 97022 WHIRLPOOL THERAPY: CPT

## 2019-02-12 ENCOUNTER — OFFICE VISIT (OUTPATIENT)
Dept: INTERNAL MEDICINE CLINIC | Age: 74
End: 2019-02-12
Payer: MEDICARE

## 2019-02-12 VITALS — RESPIRATION RATE: 15 BRPM | SYSTOLIC BLOOD PRESSURE: 132 MMHG | DIASTOLIC BLOOD PRESSURE: 74 MMHG | HEART RATE: 64 BPM

## 2019-02-12 DIAGNOSIS — L40.9 PSORIASIS: ICD-10-CM

## 2019-02-12 DIAGNOSIS — M81.0 AGE-RELATED OSTEOPOROSIS WITHOUT CURRENT PATHOLOGICAL FRACTURE: Primary | ICD-10-CM

## 2019-02-12 DIAGNOSIS — E55.9 VITAMIN D DEFICIENCY: ICD-10-CM

## 2019-02-12 PROCEDURE — G8420 CALC BMI NORM PARAMETERS: HCPCS | Performed by: INTERNAL MEDICINE

## 2019-02-12 PROCEDURE — 1036F TOBACCO NON-USER: CPT | Performed by: INTERNAL MEDICINE

## 2019-02-12 PROCEDURE — 1101F PT FALLS ASSESS-DOCD LE1/YR: CPT | Performed by: INTERNAL MEDICINE

## 2019-02-12 PROCEDURE — 1090F PRES/ABSN URINE INCON ASSESS: CPT | Performed by: INTERNAL MEDICINE

## 2019-02-12 PROCEDURE — G8482 FLU IMMUNIZE ORDER/ADMIN: HCPCS | Performed by: INTERNAL MEDICINE

## 2019-02-12 PROCEDURE — 1123F ACP DISCUSS/DSCN MKR DOCD: CPT | Performed by: INTERNAL MEDICINE

## 2019-02-12 PROCEDURE — 4040F PNEUMOC VAC/ADMIN/RCVD: CPT | Performed by: INTERNAL MEDICINE

## 2019-02-12 PROCEDURE — G8399 PT W/DXA RESULTS DOCUMENT: HCPCS | Performed by: INTERNAL MEDICINE

## 2019-02-12 PROCEDURE — 3017F COLORECTAL CA SCREEN DOC REV: CPT | Performed by: INTERNAL MEDICINE

## 2019-02-12 PROCEDURE — G8427 DOCREV CUR MEDS BY ELIG CLIN: HCPCS | Performed by: INTERNAL MEDICINE

## 2019-02-12 PROCEDURE — 99213 OFFICE O/P EST LOW 20 MIN: CPT | Performed by: INTERNAL MEDICINE

## 2019-02-12 RX ORDER — RISEDRONATE SODIUM 35 MG/1
35 TABLET, FILM COATED ORAL
Qty: 4 TABLET | Refills: 5 | Status: SHIPPED | OUTPATIENT
Start: 2019-02-12 | End: 2019-08-19 | Stop reason: SDUPTHER

## 2019-02-13 ENCOUNTER — HOSPITAL ENCOUNTER (OUTPATIENT)
Dept: PHYSICAL THERAPY | Age: 74
Setting detail: THERAPIES SERIES
Discharge: HOME | End: 2019-02-13
Payer: MEDICARE

## 2019-02-13 PROCEDURE — 97530 THERAPEUTIC ACTIVITIES: CPT

## 2019-02-13 PROCEDURE — 97022 WHIRLPOOL THERAPY: CPT

## 2019-07-25 ENCOUNTER — APPOINTMENT (OUTPATIENT)
Dept: GENERAL RADIOLOGY | Age: 74
End: 2019-07-25
Payer: MEDICARE

## 2019-07-25 ENCOUNTER — HOSPITAL ENCOUNTER (EMERGENCY)
Age: 74
Discharge: HOME | End: 2019-07-25
Payer: MEDICARE

## 2019-07-25 VITALS
SYSTOLIC BLOOD PRESSURE: 172 MMHG | OXYGEN SATURATION: 99 % | HEART RATE: 70 BPM | TEMPERATURE: 97.8 F | BODY MASS INDEX: 22.66 KG/M2 | WEIGHT: 120 LBS | RESPIRATION RATE: 16 BRPM | HEIGHT: 61 IN | DIASTOLIC BLOOD PRESSURE: 68 MMHG

## 2019-07-25 DIAGNOSIS — S62.102A CLOSED FRACTURE OF LEFT WRIST, INITIAL ENCOUNTER: Primary | ICD-10-CM

## 2019-07-25 DIAGNOSIS — S62.101A CLOSED FRACTURE OF RIGHT WRIST, INITIAL ENCOUNTER: ICD-10-CM

## 2019-07-25 PROCEDURE — 73110 X-RAY EXAM OF WRIST: CPT

## 2019-07-25 PROCEDURE — 4500000028 HC INTERMEDIATE PROCEDURE

## 2019-07-25 PROCEDURE — 99283 EMERGENCY DEPT VISIT LOW MDM: CPT

## 2019-07-25 RX ORDER — HYDROCODONE BITARTRATE AND ACETAMINOPHEN 5; 325 MG/1; MG/1
1 TABLET ORAL EVERY 4 HOURS PRN
Qty: 15 TABLET | Refills: 0 | Status: SHIPPED | OUTPATIENT
Start: 2019-07-25 | End: 2019-07-30

## 2019-07-25 ASSESSMENT — PAIN SCALES - GENERAL: PAINLEVEL_OUTOF10: 8

## 2019-07-25 ASSESSMENT — PAIN DESCRIPTION - ORIENTATION: ORIENTATION: RIGHT;LEFT

## 2019-07-25 ASSESSMENT — PAIN DESCRIPTION - LOCATION: LOCATION: WRIST

## 2019-07-25 NOTE — ED PROVIDER NOTES
HYDROcodone-acetaminophen (NORCO) 5-325 MG per tablet Take 1 tablet by mouth every 4 hours as needed for Pain for up to 5 days. 15 tablet 0    risedronate (ACTONEL) 35 MG tablet Take 1 tablet by mouth every 7 days Take once per week in the morning with a full glass of water, on an empty stomach, and do not take anything else by mouth or lie down for the next 30 minutes. 4 tablet 5    folic acid-pyridoxine-cyancobalamin (FOLBIC) 2.5-25-2 MG TABS Take 1 tablet by mouth daily 90 tablet 3    zoster recombinant adjuvanted vaccine (SHINGRIX) 50 MCG/0.5ML SUSR injection 50 MCG IM then repeat 2-6 months. 0.5 mL 1    calcipotriene (DOVONEX) 0.005 % ointment Apply topically to affected areas (elbows, back, knees) 2 times daily. 60 g 0    hydrocortisone (WESTCORT) 0.2 % cream Apply twice daily as needed 45 g 3    VIRT-NAZIA FORTE 2.5-25-2 MG TABS TAKE 1 TABLET BY MOUTH ONE TIME A DAY  90 tablet 3    Biotin 1000 MCG TABS Take 1 tablet by mouth daily      Calcium Citrate-Vitamin D (CITRACAL + D PO) Take  by mouth.  Garlic 3476 MG CAPS Take  by mouth 2 times daily.  halobetasol (ULTRAVATE) 0.05 % cream Apply  topically 2 times daily. 1 Tube 1    Omega-3 Fatty Acids (OMEGA-3 FISH OIL PO) Take 1 tablet by mouth daily.  Multiple Vitamins-Minerals (ONE-A-DAY 50 PLUS PO) Take 1 tablet by mouth daily.          ALLERGIES    Allergies   Allergen Reactions    Amoxicillin-Pot Clavulanate Diarrhea    Evista [Raloxifene Hydrochloride]        SOCIAL & FAMILY HISTORY    Social History     Socioeconomic History    Marital status:      Spouse name: None    Number of children: None    Years of education: None    Highest education level: None   Occupational History    None   Social Needs    Financial resource strain: None    Food insecurity:     Worry: None     Inability: None    Transportation needs:     Medical: None     Non-medical: None   Tobacco Use    Smoking status: Never Smoker    Smokeless tobacco: Never Used   Substance and Sexual Activity    Alcohol use: No     Comment: 2 cups of tea per day    Drug use: None    Sexual activity: None   Lifestyle    Physical activity:     Days per week: None     Minutes per session: None    Stress: None   Relationships    Social connections:     Talks on phone: None     Gets together: None     Attends Sikh service: None     Active member of club or organization: None     Attends meetings of clubs or organizations: None     Relationship status: None    Intimate partner violence:     Fear of current or ex partner: None     Emotionally abused: None     Physically abused: None     Forced sexual activity: None   Other Topics Concern    None   Social History Narrative    None     History reviewed. No pertinent family history. PHYSICAL EXAM    VITAL SIGNS: BP (!) 172/68   Pulse 70   Temp 97.8 °F (36.6 °C) (Oral)   Resp 16   Ht 5' 1\" (1.549 m)   Wt 120 lb (54.4 kg)   SpO2 99%   BMI 22.67 kg/m²    Constitutional:  Well developed, well nourished, no acute distress   Eyes: EOMI. PERRL, sclera nonicteric. Anterior chambers clear. Funduscopic exam without any gross abnormality or hemorrhages. HENT:  Atraumatic, no trismus. Ears canals and TMs free of blood or clear fluid. Nasal passages and oropharynx free of blood or clear fluid. No circumferential periorbital ecchymosis or mastoid ecchymosis noted. Neck/Lymphatics: supple, no JVD, no swollen nodes. No posterior neck tenderness. Range of motion without obvious pain or deficit. Respiratory:  Lungs Clear, no retractions   Cardiovascular:   normal rate, no murmurs  GI:  Soft, nontender, normal bowel sounds  Musculoskeletal:    There is bilateral wrist swelling which is mild without obvious deformity. There is generalized tenderness to both wrist without focus or palpable defect. Range of motion moderately limited due to pain. Distally capillary refill intact. Able to move all fingers at all joints. and triscaphe joint. The bones are osteopenic. Soft tissue swelling of the wrist.     1. Acute and mildly impacted fractures of the right and left distal radial metaphysis. Question intra-articular extension on the right. No definite intra-articular extension on the left. No evidence for incongruity of the articular surfaces. 2. 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 3. Osteopenia. Xr Wrist Right (min 3 Views)    Result Date: 7/25/2019  EXAMINATION: 3 XRAY VIEWS OF THE LEFT WRIST; 3 XRAY VIEWS OF THE RIGHT WRIST 7/25/2019 5:57 pm COMPARISON: None. HISTORY: ORDERING SYSTEM PROVIDED HISTORY: Injury TECHNOLOGIST PROVIDED HISTORY: Reason for exam:->Injury Reason for Exam: fall; ORDERING SYSTEM PROVIDED HISTORY: Injury TECHNOLOGIST PROVIDED HISTORY: Reason for exam:->Injury Reason for Exam: fall Acuity: Acute Type of Exam: Initial FINDINGS: Left wrist: Three views of the left wrist were reviewed. There is an acute fracture of the left distal radius centered at the distal metaphysis. Mild impaction at the fracture site. No definite intra-articular extension of fracture lines identified. No additional fractures are evident. The bones are osteopenic. Mild-to-moderate degenerative changes of the left 1st CMC joint and triscaphe joint. Soft tissue swelling. Right wrist: Three views of the right wrist demonstrate an acute fracture of the distal metaphysis of the right radius with mild impaction at the fracture site. Question intra-articular extension of fracture lines. No significant incongruity of the articular surface. Mild-to-moderate osteoarthritis of the 1st [facility] joint and triscaphe joint. The bones are osteopenic. Soft tissue swelling of the wrist.     1. Acute and mildly impacted fractures of the right and left distal radial metaphysis. Question intra-articular extension on the right. No definite intra-articular extension on the left.   No evidence for

## 2019-07-29 RX ORDER — CALCIPOTRIENE 50 UG/G
OINTMENT TOPICAL
Qty: 60 G | Refills: 3 | Status: SHIPPED | OUTPATIENT
Start: 2019-07-29 | End: 2022-04-22

## 2019-08-05 ENCOUNTER — TELEPHONE (OUTPATIENT)
Dept: INTERNAL MEDICINE CLINIC | Age: 74
End: 2019-08-05

## 2019-08-19 RX ORDER — RISEDRONATE SODIUM 35 MG/1
TABLET, FILM COATED ORAL
Qty: 4 TABLET | Refills: 4 | Status: SHIPPED | OUTPATIENT
Start: 2019-08-19 | End: 2022-04-22

## 2019-08-30 ENCOUNTER — HOSPITAL ENCOUNTER (OUTPATIENT)
Dept: GENERAL RADIOLOGY | Age: 74
Discharge: HOME | End: 2019-08-30
Payer: MEDICARE

## 2019-08-30 ENCOUNTER — HOSPITAL ENCOUNTER (OUTPATIENT)
Age: 74
Discharge: HOME | End: 2019-08-30
Payer: MEDICARE

## 2019-08-30 ENCOUNTER — TELEPHONE (OUTPATIENT)
Dept: INTERNAL MEDICINE CLINIC | Age: 74
End: 2019-08-30

## 2019-08-30 DIAGNOSIS — M54.5 LOW BACK PAIN, UNSPECIFIED BACK PAIN LATERALITY, UNSPECIFIED CHRONICITY, WITH SCIATICA PRESENCE UNSPECIFIED: Primary | ICD-10-CM

## 2019-08-30 DIAGNOSIS — M54.5 LOW BACK PAIN, UNSPECIFIED BACK PAIN LATERALITY, UNSPECIFIED CHRONICITY, WITH SCIATICA PRESENCE UNSPECIFIED: ICD-10-CM

## 2019-08-30 PROCEDURE — 72100 X-RAY EXAM L-S SPINE 2/3 VWS: CPT

## 2019-09-01 ENCOUNTER — TELEPHONE (OUTPATIENT)
Dept: INTERNAL MEDICINE CLINIC | Age: 74
End: 2019-09-01

## 2019-09-01 ENCOUNTER — HOSPITAL ENCOUNTER (OUTPATIENT)
Age: 74
Setting detail: OBSERVATION
Discharge: HOME | End: 2019-09-04
Attending: EMERGENCY MEDICINE | Admitting: HOSPITALIST
Payer: MEDICARE

## 2019-09-01 ENCOUNTER — APPOINTMENT (OUTPATIENT)
Dept: CT IMAGING | Age: 74
End: 2019-09-01
Payer: MEDICARE

## 2019-09-01 DIAGNOSIS — R52 INTRACTABLE PAIN: ICD-10-CM

## 2019-09-01 DIAGNOSIS — S32.10XA CLOSED FRACTURE OF SACRUM, UNSPECIFIED PORTION OF SACRUM, INITIAL ENCOUNTER (HCC): Primary | ICD-10-CM

## 2019-09-01 PROBLEM — M84.48XA SACRAL INSUFFICIENCY FRACTURE: Status: ACTIVE | Noted: 2019-09-01

## 2019-09-01 LAB
ALBUMIN SERPL-MCNC: 4.2 GM/DL (ref 3.4–5)
ALP BLD-CCNC: 130 IU/L (ref 40–129)
ALT SERPL-CCNC: 17 U/L (ref 10–40)
ANION GAP SERPL CALCULATED.3IONS-SCNC: 11 MMOL/L (ref 4–16)
AST SERPL-CCNC: 33 IU/L (ref 15–37)
BACTERIA: NEGATIVE /HPF
BASOPHILS ABSOLUTE: 0 K/CU MM
BASOPHILS RELATIVE PERCENT: 0.5 % (ref 0–1)
BILIRUB SERPL-MCNC: 0.3 MG/DL (ref 0–1)
BILIRUBIN URINE: NEGATIVE MG/DL
BLOOD, URINE: NEGATIVE
BUN BLDV-MCNC: 12 MG/DL (ref 6–23)
CALCIUM SERPL-MCNC: 8.9 MG/DL (ref 8.3–10.6)
CHLORIDE BLD-SCNC: 94 MMOL/L (ref 99–110)
CLARITY: CLEAR
CO2: 24 MMOL/L (ref 21–32)
COLOR: ABNORMAL
CREAT SERPL-MCNC: 0.7 MG/DL (ref 0.6–1.1)
DIFFERENTIAL TYPE: ABNORMAL
EOSINOPHILS ABSOLUTE: 0.1 K/CU MM
EOSINOPHILS RELATIVE PERCENT: 1.3 % (ref 0–3)
GFR AFRICAN AMERICAN: >60 ML/MIN/1.73M2
GFR NON-AFRICAN AMERICAN: >60 ML/MIN/1.73M2
GLUCOSE BLD-MCNC: 106 MG/DL (ref 70–99)
GLUCOSE, URINE: NEGATIVE MG/DL
HCT VFR BLD CALC: 37.4 % (ref 37–47)
HEMOGLOBIN: 12.2 GM/DL (ref 12.5–16)
IMMATURE NEUTROPHIL %: 0.2 % (ref 0–0.43)
KETONES, URINE: ABNORMAL MG/DL
LEUKOCYTE ESTERASE, URINE: NEGATIVE
LYMPHOCYTES ABSOLUTE: 1.3 K/CU MM
LYMPHOCYTES RELATIVE PERCENT: 15.6 % (ref 24–44)
MCH RBC QN AUTO: 29.9 PG (ref 27–31)
MCHC RBC AUTO-ENTMCNC: 32.6 % (ref 32–36)
MCV RBC AUTO: 91.7 FL (ref 78–100)
MONOCYTES ABSOLUTE: 0.6 K/CU MM
MONOCYTES RELATIVE PERCENT: 7.3 % (ref 0–4)
NITRITE URINE, QUANTITATIVE: NEGATIVE
NUCLEATED RBC %: 0 %
PDW BLD-RTO: 12.5 % (ref 11.7–14.9)
PH, URINE: 7 (ref 5–8)
PLATELET # BLD: 340 K/CU MM (ref 140–440)
PMV BLD AUTO: 9.1 FL (ref 7.5–11.1)
POTASSIUM SERPL-SCNC: 3.8 MMOL/L (ref 3.5–5.1)
PROTEIN UA: NEGATIVE MG/DL
RBC # BLD: 4.08 M/CU MM (ref 4.2–5.4)
RBC URINE: <1 /HPF (ref 0–6)
SEGMENTED NEUTROPHILS ABSOLUTE COUNT: 6.3 K/CU MM
SEGMENTED NEUTROPHILS RELATIVE PERCENT: 75.1 % (ref 36–66)
SODIUM BLD-SCNC: 129 MMOL/L (ref 135–145)
SPECIFIC GRAVITY UA: 1 (ref 1–1.03)
TOTAL IMMATURE NEUTOROPHIL: 0.02 K/CU MM
TOTAL NUCLEATED RBC: 0 K/CU MM
TOTAL PROTEIN: 7.2 GM/DL (ref 6.4–8.2)
TRICHOMONAS: ABNORMAL /HPF
UROBILINOGEN, URINE: NORMAL MG/DL (ref 0.2–1)
WBC # BLD: 8.4 K/CU MM (ref 4–10.5)
WBC UA: <1 /HPF (ref 0–5)

## 2019-09-01 PROCEDURE — 6360000002 HC RX W HCPCS: Performed by: PHYSICIAN ASSISTANT

## 2019-09-01 PROCEDURE — 2580000003 HC RX 258: Performed by: PHYSICIAN ASSISTANT

## 2019-09-01 PROCEDURE — 96372 THER/PROPH/DIAG INJ SC/IM: CPT

## 2019-09-01 PROCEDURE — 85025 COMPLETE CBC W/AUTO DIFF WBC: CPT

## 2019-09-01 PROCEDURE — 96375 TX/PRO/DX INJ NEW DRUG ADDON: CPT

## 2019-09-01 PROCEDURE — 96376 TX/PRO/DX INJ SAME DRUG ADON: CPT

## 2019-09-01 PROCEDURE — 6370000000 HC RX 637 (ALT 250 FOR IP): Performed by: NURSE PRACTITIONER

## 2019-09-01 PROCEDURE — 96361 HYDRATE IV INFUSION ADD-ON: CPT

## 2019-09-01 PROCEDURE — 6360000002 HC RX W HCPCS: Performed by: NURSE PRACTITIONER

## 2019-09-01 PROCEDURE — 2580000003 HC RX 258: Performed by: NURSE PRACTITIONER

## 2019-09-01 PROCEDURE — 1200000000 HC SEMI PRIVATE

## 2019-09-01 PROCEDURE — G0378 HOSPITAL OBSERVATION PER HR: HCPCS

## 2019-09-01 PROCEDURE — 81001 URINALYSIS AUTO W/SCOPE: CPT

## 2019-09-01 PROCEDURE — 99285 EMERGENCY DEPT VISIT HI MDM: CPT

## 2019-09-01 PROCEDURE — 80053 COMPREHEN METABOLIC PANEL: CPT

## 2019-09-01 PROCEDURE — 74177 CT ABD & PELVIS W/CONTRAST: CPT

## 2019-09-01 PROCEDURE — 96374 THER/PROPH/DIAG INJ IV PUSH: CPT

## 2019-09-01 PROCEDURE — 6360000004 HC RX CONTRAST MEDICATION: Performed by: PHYSICIAN ASSISTANT

## 2019-09-01 PROCEDURE — 72131 CT LUMBAR SPINE W/O DYE: CPT

## 2019-09-01 PROCEDURE — 6370000000 HC RX 637 (ALT 250 FOR IP): Performed by: PHYSICIAN ASSISTANT

## 2019-09-01 RX ORDER — OXYCODONE HYDROCHLORIDE AND ACETAMINOPHEN 5; 325 MG/1; MG/1
2 TABLET ORAL EVERY 4 HOURS PRN
Status: CANCELLED | OUTPATIENT
Start: 2019-09-01

## 2019-09-01 RX ORDER — SENNA PLUS 8.6 MG/1
1 TABLET ORAL NIGHTLY
Status: DISCONTINUED | OUTPATIENT
Start: 2019-09-01 | End: 2019-09-04 | Stop reason: HOSPADM

## 2019-09-01 RX ORDER — SODIUM CHLORIDE 0.9 % (FLUSH) 0.9 %
10 SYRINGE (ML) INJECTION PRN
Status: DISCONTINUED | OUTPATIENT
Start: 2019-09-01 | End: 2019-09-04 | Stop reason: HOSPADM

## 2019-09-01 RX ORDER — ACETAMINOPHEN 80 MG
TABLET,CHEWABLE ORAL
Status: DISPENSED
Start: 2019-09-01 | End: 2019-09-02

## 2019-09-01 RX ORDER — OXYCODONE HYDROCHLORIDE AND ACETAMINOPHEN 5; 325 MG/1; MG/1
1 TABLET ORAL EVERY 4 HOURS PRN
Status: CANCELLED | OUTPATIENT
Start: 2019-09-01

## 2019-09-01 RX ORDER — MORPHINE SULFATE 4 MG/ML
4 INJECTION, SOLUTION INTRAMUSCULAR; INTRAVENOUS ONCE
Status: COMPLETED | OUTPATIENT
Start: 2019-09-01 | End: 2019-09-01

## 2019-09-01 RX ORDER — ONDANSETRON 2 MG/ML
4 INJECTION INTRAMUSCULAR; INTRAVENOUS ONCE
Status: COMPLETED | OUTPATIENT
Start: 2019-09-01 | End: 2019-09-01

## 2019-09-01 RX ORDER — PANTOPRAZOLE SODIUM 40 MG/1
40 TABLET, DELAYED RELEASE ORAL
Status: DISCONTINUED | OUTPATIENT
Start: 2019-09-02 | End: 2019-09-04 | Stop reason: HOSPADM

## 2019-09-01 RX ORDER — DIAPER,BRIEF,INFANT-TODD,DISP
EACH MISCELLANEOUS 2 TIMES DAILY PRN
Status: DISCONTINUED | OUTPATIENT
Start: 2019-09-01 | End: 2019-09-04 | Stop reason: HOSPADM

## 2019-09-01 RX ORDER — B12/LEVOMEFOLATE CALCIUM/B-6 2-1.13-25
1 TABLET ORAL DAILY
Status: DISCONTINUED | OUTPATIENT
Start: 2019-09-01 | End: 2019-09-04 | Stop reason: HOSPADM

## 2019-09-01 RX ORDER — OXYCODONE HYDROCHLORIDE AND ACETAMINOPHEN 5; 325 MG/1; MG/1
1 TABLET ORAL ONCE
Status: COMPLETED | OUTPATIENT
Start: 2019-09-01 | End: 2019-09-01

## 2019-09-01 RX ORDER — ONDANSETRON 2 MG/ML
4 INJECTION INTRAMUSCULAR; INTRAVENOUS EVERY 6 HOURS PRN
Status: DISCONTINUED | OUTPATIENT
Start: 2019-09-01 | End: 2019-09-04 | Stop reason: HOSPADM

## 2019-09-01 RX ORDER — CLOBETASOL PROPIONATE 0.5 MG/G
CREAM TOPICAL 2 TIMES DAILY PRN
Status: DISCONTINUED | OUTPATIENT
Start: 2019-09-01 | End: 2019-09-04 | Stop reason: HOSPADM

## 2019-09-01 RX ORDER — BIOTIN 1 MG
1 TABLET ORAL DAILY
Status: DISCONTINUED | OUTPATIENT
Start: 2019-09-01 | End: 2019-09-01 | Stop reason: CLARIF

## 2019-09-01 RX ORDER — SODIUM CHLORIDE 0.9 % (FLUSH) 0.9 %
10 SYRINGE (ML) INJECTION EVERY 12 HOURS SCHEDULED
Status: DISCONTINUED | OUTPATIENT
Start: 2019-09-01 | End: 2019-09-04 | Stop reason: HOSPADM

## 2019-09-01 RX ORDER — 0.9 % SODIUM CHLORIDE 0.9 %
1000 INTRAVENOUS SOLUTION INTRAVENOUS ONCE
Status: COMPLETED | OUTPATIENT
Start: 2019-09-01 | End: 2019-09-01

## 2019-09-01 RX ORDER — OXYCODONE HYDROCHLORIDE AND ACETAMINOPHEN 5; 325 MG/1; MG/1
1 TABLET ORAL EVERY 4 HOURS PRN
Status: DISCONTINUED | OUTPATIENT
Start: 2019-09-01 | End: 2019-09-03

## 2019-09-01 RX ORDER — MORPHINE SULFATE 4 MG/ML
4 INJECTION, SOLUTION INTRAMUSCULAR; INTRAVENOUS EVERY 4 HOURS PRN
Status: CANCELLED | OUTPATIENT
Start: 2019-09-01

## 2019-09-01 RX ORDER — LIDOCAINE 4 G/G
1 PATCH TOPICAL ONCE
Status: COMPLETED | OUTPATIENT
Start: 2019-09-01 | End: 2019-09-02

## 2019-09-01 RX ORDER — LIDOCAINE 4 G/G
1 PATCH TOPICAL DAILY
Status: DISCONTINUED | OUTPATIENT
Start: 2019-09-02 | End: 2019-09-04 | Stop reason: HOSPADM

## 2019-09-01 RX ORDER — CYCLOBENZAPRINE HCL 10 MG
5 TABLET ORAL 3 TIMES DAILY PRN
Status: DISCONTINUED | OUTPATIENT
Start: 2019-09-01 | End: 2019-09-02

## 2019-09-01 RX ORDER — ACETAMINOPHEN 500 MG
1000 TABLET ORAL 3 TIMES DAILY
Status: DISCONTINUED | OUTPATIENT
Start: 2019-09-01 | End: 2019-09-01

## 2019-09-01 RX ORDER — ACETAMINOPHEN 500 MG
1000 TABLET ORAL 2 TIMES DAILY
Status: DISCONTINUED | OUTPATIENT
Start: 2019-09-01 | End: 2019-09-04 | Stop reason: HOSPADM

## 2019-09-01 RX ORDER — KETOROLAC TROMETHAMINE 30 MG/ML
15 INJECTION, SOLUTION INTRAMUSCULAR; INTRAVENOUS EVERY 6 HOURS PRN
Status: DISCONTINUED | OUTPATIENT
Start: 2019-09-01 | End: 2019-09-04 | Stop reason: HOSPADM

## 2019-09-01 RX ADMIN — KETOROLAC TROMETHAMINE 15 MG: 30 INJECTION, SOLUTION INTRAMUSCULAR; INTRAVENOUS at 17:57

## 2019-09-01 RX ADMIN — ENOXAPARIN SODIUM 40 MG: 40 INJECTION SUBCUTANEOUS at 15:52

## 2019-09-01 RX ADMIN — ACETAMINOPHEN 1000 MG: 500 TABLET ORAL at 20:11

## 2019-09-01 RX ADMIN — SENNOSIDES 8.6 MG: 8.6 TABLET, FILM COATED ORAL at 20:10

## 2019-09-01 RX ADMIN — KETOROLAC TROMETHAMINE 15 MG: 30 INJECTION, SOLUTION INTRAMUSCULAR; INTRAVENOUS at 23:52

## 2019-09-01 RX ADMIN — MORPHINE SULFATE 4 MG: 4 INJECTION, SOLUTION INTRAMUSCULAR; INTRAVENOUS at 11:13

## 2019-09-01 RX ADMIN — SODIUM CHLORIDE 1000 ML: 9 INJECTION, SOLUTION INTRAVENOUS at 10:55

## 2019-09-01 RX ADMIN — Medication 10 ML: at 20:11

## 2019-09-01 RX ADMIN — IOPAMIDOL 75 ML: 755 INJECTION, SOLUTION INTRAVENOUS at 10:40

## 2019-09-01 RX ADMIN — HYDROMORPHONE HYDROCHLORIDE 0.5 MG: 1 INJECTION, SOLUTION INTRAMUSCULAR; INTRAVENOUS; SUBCUTANEOUS at 20:11

## 2019-09-01 RX ADMIN — OXYCODONE HYDROCHLORIDE AND ACETAMINOPHEN 1 TABLET: 5; 325 TABLET ORAL at 10:01

## 2019-09-01 RX ADMIN — CYCLOBENZAPRINE HYDROCHLORIDE 5 MG: 10 TABLET, FILM COATED ORAL at 20:10

## 2019-09-01 RX ADMIN — HYDROMORPHONE HYDROCHLORIDE 0.5 MG: 1 INJECTION, SOLUTION INTRAMUSCULAR; INTRAVENOUS; SUBCUTANEOUS at 15:44

## 2019-09-01 RX ADMIN — ONDANSETRON 4 MG: 2 INJECTION INTRAMUSCULAR; INTRAVENOUS at 11:13

## 2019-09-01 RX ADMIN — MORPHINE SULFATE 4 MG: 4 INJECTION, SOLUTION INTRAMUSCULAR; INTRAVENOUS at 12:46

## 2019-09-01 ASSESSMENT — PAIN SCALES - GENERAL
PAINLEVEL_OUTOF10: 7
PAINLEVEL_OUTOF10: 8
PAINLEVEL_OUTOF10: 4
PAINLEVEL_OUTOF10: 5
PAINLEVEL_OUTOF10: 9
PAINLEVEL_OUTOF10: 7
PAINLEVEL_OUTOF10: 6
PAINLEVEL_OUTOF10: 9
PAINLEVEL_OUTOF10: 7
PAINLEVEL_OUTOF10: 9

## 2019-09-01 ASSESSMENT — PAIN DESCRIPTION - PAIN TYPE
TYPE: CHRONIC PAIN
TYPE: ACUTE PAIN
TYPE: CHRONIC PAIN
TYPE: CHRONIC PAIN

## 2019-09-01 NOTE — H&P
subacute  fractures in the setting of underlying insufficiency fractures. 2. No acute inflammatory process.  Normal appendix.   3. Mild gallbladder distention.           Electronically signed by ABE Fierro NP on 9/1/2019 at 1:33 PM

## 2019-09-01 NOTE — ED PROVIDER NOTES
 Food insecurity:     Worry: None     Inability: None    Transportation needs:     Medical: None     Non-medical: None   Tobacco Use    Smoking status: Never Smoker    Smokeless tobacco: Never Used   Substance and Sexual Activity    Alcohol use: No     Comment: 2 cups of tea per day    Drug use: None    Sexual activity: None   Lifestyle    Physical activity:     Days per week: None     Minutes per session: None    Stress: None   Relationships    Social connections:     Talks on phone: None     Gets together: None     Attends Restoration service: None     Active member of club or organization: None     Attends meetings of clubs or organizations: None     Relationship status: None    Intimate partner violence:     Fear of current or ex partner: None     Emotionally abused: None     Physically abused: None     Forced sexual activity: None   Other Topics Concern    None   Social History Narrative    None       PHYSICAL EXAM    VITAL SIGNS: BP (!) 154/69   Pulse 78   Temp 97.9 °F (36.6 °C) (Oral)   Resp 16   Ht 5' 1\" (1.549 m)   Wt 120 lb (54.4 kg)   SpO2 100%   BMI 22.67 kg/m²    Patient was noted to be afebrile    Constitutional:  Well developed, well nourished. HENT:  Atraumatic,  Moist mucus membranes. Eyes:  No orbital trauma. No scleral icterus. Neck: No JVD, supple. No enlarged lymph nodes. Cardiovascular:  Normal rate, regular rhythm, no murmurs/rubs/gallops  Respiratory:  No respiratory distress, normal breath sounds. Abdomen: Bowel sounds normal, Soft, No tenderness, no masses. Musculoskeletal:  No edema, no deformities. Back:   -Mild swelling to lower lumbar region. No ecchymosis. Area of psoriasis to gluteal cleft with no induration or fluctuance. - + Midline lumbar sacral and coccyx tenderness without fluctuance, warmth    No CVA tenderness to percussion  Integument:  Well hydrated, no rash, no pallor. Neurologic:    No obvious neurological deficits.   Patient moves without difficulty or weakness. Motor & Sensation intact bilateral lower extremities. Patella and Achilles reflexes 2+ bilaterally  Vascular:  Distal pulses and capillary refill intact bilateral lower extremities      RADIOLOGY/PROCEDURES    CT LUMBAR SPINE WO CONTRAST   Preliminary Result   1. Bilateral sacral fractures which appear to reflect acute or subacute   fractures in the setting of underlying insufficiency fractures. 2. No acute inflammatory process. Normal appendix. 3. Mild gallbladder distention. CT ABDOMEN PELVIS W IV CONTRAST   Preliminary Result   1. Bilateral sacral fractures which appear to reflect acute or subacute   fractures in the setting of underlying insufficiency fractures. 2. No acute inflammatory process. Normal appendix. 3. Mild gallbladder distention.            Labs:    Results for orders placed or performed during the hospital encounter of 09/01/19   CBC with Auto Diff   Result Value Ref Range    WBC 8.4 4.0 - 10.5 K/CU MM    RBC 4.08 (L) 4.2 - 5.4 M/CU MM    Hemoglobin 12.2 (L) 12.5 - 16.0 GM/DL    Hematocrit 37.4 37 - 47 %    MCV 91.7 78 - 100 FL    MCH 29.9 27 - 31 PG    MCHC 32.6 32.0 - 36.0 %    RDW 12.5 11.7 - 14.9 %    Platelets 029 244 - 454 K/CU MM    MPV 9.1 7.5 - 11.1 FL    Differential Type AUTOMATED DIFFERENTIAL     Segs Relative 75.1 (H) 36 - 66 %    Lymphocytes % 15.6 (L) 24 - 44 %    Monocytes % 7.3 (H) 0 - 4 %    Eosinophils % 1.3 0 - 3 %    Basophils % 0.5 0 - 1 %    Segs Absolute 6.3 K/CU MM    Lymphocytes Absolute 1.3 K/CU MM    Monocytes Absolute 0.6 K/CU MM    Eosinophils Absolute 0.1 K/CU MM    Basophils Absolute 0.0 K/CU MM    Nucleated RBC % 0.0 %    Total Nucleated RBC 0.0 K/CU MM    Total Immature Neutrophil 0.02 K/CU MM    Immature Neutrophil % 0.2 0 - 0.43 %   Comprehensive Metabolic Panel   Result Value Ref Range    Sodium 129 (L) 135 - 145 MMOL/L    Potassium 3.8 3.5 - 5.1 MMOL/L    Chloride 94 (L) 99 - 110 mMol/L    CO2 24 21 - 32 showed bilateral sacral fractures which appear to reflect acute or subacute fractures, no acute inflammatory process in the abdomen, no abnormal appendix, mild gallbladder distention. I discussed lab and imaging with patient today. Consult is made to patient's orthopedist Dr. Desiree Medina who has no further recommendations at this time, possible outpatient follow-up with back specialist however this is not emergent. Patient states pain has not improved at all with medications provided in the emergency department. With patient being 69years old, taking Percocet at home she is narcotic naïve I am hesitant of adding increased narcotic pain medication to go home with due to risks of fall for overdose. Patient agrees that she does not feel safe going home with pain continuing to worsen. I believe patient require admission for further evaluation and treatment. Consult with hospitalist who accepts admission. Clinical  IMPRESSION    1. Closed fracture of sacrum, unspecified portion of sacrum, initial encounter ([facility] [address]    2. Intractable pain        Patient admitted    Comment: Please note this report has been produced using speech recognition software and may contain errors related to that system including errors in grammar, punctuation, and spelling, as well as words and phrases that may be inappropriate. If there are any questions or concerns please feel free to contact the dictating provider for clarification.       Rene Rivero PA-C  09/01/19 [id number]

## 2019-09-01 NOTE — TELEPHONE ENCOUNTER
Patient called with hx of a fall 5 weeks ago where she fractured both wrists and was treated by Dr Jer Katz. She also complained of lower back pain and an xray was done in his office and told it looks like you have a fracture but nothing can be done. She has been on Percocet for pain but now the pain has increased and now involves both legs. An LS spine xray from Friday reported an S3 fracture. She has an OV with me this Tuesday. It was recommended she report to the ER for further evaluation.

## 2019-09-02 LAB
ANION GAP SERPL CALCULATED.3IONS-SCNC: 12 MMOL/L (ref 4–16)
BASOPHILS ABSOLUTE: 0 K/CU MM
BASOPHILS RELATIVE PERCENT: 0.7 % (ref 0–1)
BUN BLDV-MCNC: 18 MG/DL (ref 6–23)
CALCIUM SERPL-MCNC: 8.8 MG/DL (ref 8.3–10.6)
CHLORIDE BLD-SCNC: 104 MMOL/L (ref 99–110)
CO2: 23 MMOL/L (ref 21–32)
CREAT SERPL-MCNC: 0.8 MG/DL (ref 0.6–1.1)
DIFFERENTIAL TYPE: ABNORMAL
EOSINOPHILS ABSOLUTE: 0.2 K/CU MM
EOSINOPHILS RELATIVE PERCENT: 3.1 % (ref 0–3)
GFR AFRICAN AMERICAN: >60 ML/MIN/1.73M2
GFR NON-AFRICAN AMERICAN: >60 ML/MIN/1.73M2
GLUCOSE BLD-MCNC: 90 MG/DL (ref 70–99)
HCT VFR BLD CALC: 33.6 % (ref 37–47)
HEMOGLOBIN: 10.8 GM/DL (ref 12.5–16)
IMMATURE NEUTROPHIL %: 0.2 % (ref 0–0.43)
LYMPHOCYTES ABSOLUTE: 1.5 K/CU MM
LYMPHOCYTES RELATIVE PERCENT: 25.2 % (ref 24–44)
MCH RBC QN AUTO: 30.3 PG (ref 27–31)
MCHC RBC AUTO-ENTMCNC: 32.1 % (ref 32–36)
MCV RBC AUTO: 94.4 FL (ref 78–100)
MONOCYTES ABSOLUTE: 0.6 K/CU MM
MONOCYTES RELATIVE PERCENT: 10.9 % (ref 0–4)
NUCLEATED RBC %: 0 %
PDW BLD-RTO: 12.9 % (ref 11.7–14.9)
PLATELET # BLD: 279 K/CU MM (ref 140–440)
PMV BLD AUTO: 8.6 FL (ref 7.5–11.1)
POTASSIUM SERPL-SCNC: 3.8 MMOL/L (ref 3.5–5.1)
RBC # BLD: 3.56 M/CU MM (ref 4.2–5.4)
SEGMENTED NEUTROPHILS ABSOLUTE COUNT: 3.5 K/CU MM
SEGMENTED NEUTROPHILS RELATIVE PERCENT: 59.9 % (ref 36–66)
SODIUM BLD-SCNC: 139 MMOL/L (ref 135–145)
TOTAL IMMATURE NEUTOROPHIL: 0.01 K/CU MM
TOTAL NUCLEATED RBC: 0 K/CU MM
WBC # BLD: 5.8 K/CU MM (ref 4–10.5)

## 2019-09-02 PROCEDURE — 96372 THER/PROPH/DIAG INJ SC/IM: CPT

## 2019-09-02 PROCEDURE — 94761 N-INVAS EAR/PLS OXIMETRY MLT: CPT

## 2019-09-02 PROCEDURE — 85025 COMPLETE CBC W/AUTO DIFF WBC: CPT

## 2019-09-02 PROCEDURE — G0378 HOSPITAL OBSERVATION PER HR: HCPCS

## 2019-09-02 PROCEDURE — 2580000003 HC RX 258: Performed by: NURSE PRACTITIONER

## 2019-09-02 PROCEDURE — 97162 PT EVAL MOD COMPLEX 30 MIN: CPT

## 2019-09-02 PROCEDURE — 97530 THERAPEUTIC ACTIVITIES: CPT

## 2019-09-02 PROCEDURE — 1200000000 HC SEMI PRIVATE

## 2019-09-02 PROCEDURE — 6370000000 HC RX 637 (ALT 250 FOR IP): Performed by: NURSE PRACTITIONER

## 2019-09-02 PROCEDURE — 36415 COLL VENOUS BLD VENIPUNCTURE: CPT

## 2019-09-02 PROCEDURE — 6370000000 HC RX 637 (ALT 250 FOR IP): Performed by: INTERNAL MEDICINE

## 2019-09-02 PROCEDURE — 97116 GAIT TRAINING THERAPY: CPT

## 2019-09-02 PROCEDURE — 80048 BASIC METABOLIC PNL TOTAL CA: CPT

## 2019-09-02 PROCEDURE — 96376 TX/PRO/DX INJ SAME DRUG ADON: CPT

## 2019-09-02 PROCEDURE — 6360000002 HC RX W HCPCS: Performed by: NURSE PRACTITIONER

## 2019-09-02 RX ORDER — CYCLOBENZAPRINE HCL 10 MG
5 TABLET ORAL 3 TIMES DAILY
Status: DISCONTINUED | OUTPATIENT
Start: 2019-09-02 | End: 2019-09-04 | Stop reason: HOSPADM

## 2019-09-02 RX ADMIN — Medication 10 ML: at 08:29

## 2019-09-02 RX ADMIN — OXYCODONE HYDROCHLORIDE AND ACETAMINOPHEN 1 TABLET: 5; 325 TABLET ORAL at 21:16

## 2019-09-02 RX ADMIN — CYCLOBENZAPRINE HYDROCHLORIDE 5 MG: 10 TABLET, FILM COATED ORAL at 21:15

## 2019-09-02 RX ADMIN — CYCLOBENZAPRINE HYDROCHLORIDE 5 MG: 10 TABLET, FILM COATED ORAL at 11:49

## 2019-09-02 RX ADMIN — SENNOSIDES 8.6 MG: 8.6 TABLET, FILM COATED ORAL at 21:16

## 2019-09-02 RX ADMIN — HYDROMORPHONE HYDROCHLORIDE 0.5 MG: 1 INJECTION, SOLUTION INTRAMUSCULAR; INTRAVENOUS; SUBCUTANEOUS at 01:45

## 2019-09-02 RX ADMIN — KETOROLAC TROMETHAMINE 15 MG: 30 INJECTION, SOLUTION INTRAMUSCULAR; INTRAVENOUS at 11:49

## 2019-09-02 RX ADMIN — Medication 1 TABLET: at 08:28

## 2019-09-02 RX ADMIN — PANTOPRAZOLE SODIUM 40 MG: 40 TABLET, DELAYED RELEASE ORAL at 05:08

## 2019-09-02 RX ADMIN — OXYCODONE HYDROCHLORIDE AND ACETAMINOPHEN 1 TABLET: 5; 325 TABLET ORAL at 17:02

## 2019-09-02 RX ADMIN — KETOROLAC TROMETHAMINE 15 MG: 30 INJECTION, SOLUTION INTRAMUSCULAR; INTRAVENOUS at 04:29

## 2019-09-02 RX ADMIN — OXYCODONE HYDROCHLORIDE AND ACETAMINOPHEN 1 TABLET: 5; 325 TABLET ORAL at 12:44

## 2019-09-02 RX ADMIN — HYDROMORPHONE HYDROCHLORIDE 0.5 MG: 1 INJECTION, SOLUTION INTRAMUSCULAR; INTRAVENOUS; SUBCUTANEOUS at 06:02

## 2019-09-02 RX ADMIN — OXYCODONE HYDROCHLORIDE AND ACETAMINOPHEN 1 TABLET: 5; 325 TABLET ORAL at 08:28

## 2019-09-02 RX ADMIN — ENOXAPARIN SODIUM 40 MG: 40 INJECTION SUBCUTANEOUS at 08:29

## 2019-09-02 RX ADMIN — KETOROLAC TROMETHAMINE 15 MG: 30 INJECTION, SOLUTION INTRAMUSCULAR; INTRAVENOUS at 17:55

## 2019-09-02 RX ADMIN — Medication 10 ML: at 21:16

## 2019-09-02 RX ADMIN — CYCLOBENZAPRINE HYDROCHLORIDE 5 MG: 10 TABLET, FILM COATED ORAL at 06:02

## 2019-09-02 RX ADMIN — ACETAMINOPHEN 1000 MG: 500 TABLET ORAL at 08:28

## 2019-09-02 RX ADMIN — OXYCODONE HYDROCHLORIDE AND ACETAMINOPHEN 1 TABLET: 5; 325 TABLET ORAL at 04:28

## 2019-09-02 ASSESSMENT — PAIN SCALES - GENERAL
PAINLEVEL_OUTOF10: 5
PAINLEVEL_OUTOF10: 7
PAINLEVEL_OUTOF10: 6
PAINLEVEL_OUTOF10: 9
PAINLEVEL_OUTOF10: 9
PAINLEVEL_OUTOF10: 5
PAINLEVEL_OUTOF10: 5
PAINLEVEL_OUTOF10: 6
PAINLEVEL_OUTOF10: 0
PAINLEVEL_OUTOF10: 6
PAINLEVEL_OUTOF10: 5
PAINLEVEL_OUTOF10: 9
PAINLEVEL_OUTOF10: 5

## 2019-09-02 ASSESSMENT — PAIN DESCRIPTION - PAIN TYPE
TYPE: ACUTE PAIN
TYPE: ACUTE PAIN
TYPE: CHRONIC PAIN

## 2019-09-02 ASSESSMENT — PAIN DESCRIPTION - LOCATION
LOCATION: COCCYX;BACK
LOCATION: BACK

## 2019-09-02 ASSESSMENT — PAIN DESCRIPTION - ORIENTATION: ORIENTATION: LEFT

## 2019-09-02 NOTE — PROGRESS NOTES
Hospitalist Progress Note      Name:  Karen Spears /Age/Sex: 1945  (69 y.o. female)   MRN & CSN:  [id number] & [id number] Admission Date/Time: 2019  9:06 AM   Location:  1120/[facility]0-A PCP: Maryellen Fitzpatrick, [address] Day: 2    Assessment and Plan:   Karen Spears is a 69 y.o.  female  who presents with sacral fracture    1. Sacral fracture: Due to trauma. CT showed bilateral sacral fractures which could be acute or subacute. Pain control. Currently on Dilaudid and Percocet. Has lidocaine patch and Flexeril. Bowel regimen. Orthopedic surgery on consult. 2. Bilateral radial fracture: Status post splint. Was seen by orthopedic surgery as outpatient. Plan to remove the splint next week. 3. Hyponatremia: Resolved. 4. Osteoporosis:     Diet DIET GENERAL;   DVT Prophylaxis [] Lovenox, [x]  Heparin, [] SCDs, [] Warfarin  [] NOAC     GI Prophylaxis [x] PPI,  [] H2 Blocker,  [] Carafate,  [] Diet/Tube Feeds   Code Status Full Code   MDM [] Low, [x] Moderate,[]  High     History of Present Illness:     Chief Complaint: Sacral fracture    She was seen and examined today. Still complaining of back pain. Worse with any movement. No comfortable position. No lower extremity weakness, urinary incontinence or bowel incontinence. Ten point ROS reviewed negative, unless as noted above    Objective:   No intake or output data in the 24 hours ending 19 1222   Vitals:   Vitals:    19 0834   BP: 137/60   Pulse: 96   Resp: 16   Temp: 97.8 °F (36.6 °C)   SpO2: 96%     Physical Exam:   GEN Awake female, sitting upright in bed in no apparent distress. Appears given age. EYES Pupils are equally round. No scleral erythema, discharge, or conjunctivitis. HENT Mucous membranes are moist. Oral pharynx without exudates, no evidence of thrush. NECK Supple, no apparent thyromegaly or masses. RESP Clear to auscultation, no wheezes, rales or rhonchi.   Symmetric chest movement while on room air. CARDIO/VASC S1/S2 auscultated. Regular rate without appreciable murmurs, rubs, or gallops. No JVD or carotid bruits. Peripheral pulses equal bilaterally and palpable. No peripheral edema. GI Abdomen is soft without significant tenderness, masses, or guarding. Bowel sounds are normoactive. Rectal exam deferred. MSK No gross joint deformities. Bilateral wrist with cast.  SKIN Normal coloration, warm, dry. NEURO Cranial nerves appear grossly intact, normal speech, no lateralizing weakness. PSYCH Awake, alert, oriented x 4. Affect appropriate.     Medications:   Medications:    sodium chloride flush  10 mL Intravenous 2 times per day    enoxaparin  40 mg Subcutaneous Daily    pantoprazole  40 mg Oral QAM AC    folic acid-pyridoxine-cyancobalamin  1 tablet Oral Daily    lidocaine  1 patch Transdermal Daily    senna  1 tablet Oral Nightly    acetaminophen  1,000 mg Oral BID      Infusions:   PRN Meds:   HYDROmorphone 1 mg Q4H PRN   sodium chloride flush 10 mL PRN   sodium chloride flush 10 mL PRN   magnesium hydroxide 30 mL Daily PRN   ondansetron 4 mg Q6H PRN   cyclobenzaprine 5 mg TID PRN   hydrocortisone  BID PRN   clobetasol  BID PRN   oxyCODONE-acetaminophen 1 tablet Q4H PRN   ketorolac 15 mg Q6H PRN       Recent Labs     09/01/19  0955 09/02/19  0651   WBC 8.4 5.8   HGB 12.2* 10.8*   HCT 37.4 33.6*    279      Recent Labs     09/01/19  0955 09/02/19  0651   * 139   K 3.8 3.8   CL 94* 104   CO2 24 23   BUN 12 18   CREATININE 0.7 0.8     Recent Labs     09/01/19  0955   AST 33   ALT 17   BILITOT 0.3   ALKPHOS 130*     Imaging reviewed    Electronically signed by Jeremy Barros MD on 9/2/2019 at 12:22 PM

## 2019-09-03 PROCEDURE — G0378 HOSPITAL OBSERVATION PER HR: HCPCS

## 2019-09-03 PROCEDURE — 6360000002 HC RX W HCPCS: Performed by: NURSE PRACTITIONER

## 2019-09-03 PROCEDURE — 6370000000 HC RX 637 (ALT 250 FOR IP): Performed by: NURSE PRACTITIONER

## 2019-09-03 PROCEDURE — 6370000000 HC RX 637 (ALT 250 FOR IP): Performed by: INTERNAL MEDICINE

## 2019-09-03 PROCEDURE — 2580000003 HC RX 258: Performed by: NURSE PRACTITIONER

## 2019-09-03 PROCEDURE — 6360000002 HC RX W HCPCS: Performed by: INTERNAL MEDICINE

## 2019-09-03 PROCEDURE — 94761 N-INVAS EAR/PLS OXIMETRY MLT: CPT

## 2019-09-03 PROCEDURE — 96372 THER/PROPH/DIAG INJ SC/IM: CPT

## 2019-09-03 PROCEDURE — 94150 VITAL CAPACITY TEST: CPT

## 2019-09-03 PROCEDURE — 96376 TX/PRO/DX INJ SAME DRUG ADON: CPT

## 2019-09-03 PROCEDURE — 94664 DEMO&/EVAL PT USE INHALER: CPT

## 2019-09-03 RX ORDER — OXYCODONE HYDROCHLORIDE AND ACETAMINOPHEN 5; 325 MG/1; MG/1
2 TABLET ORAL EVERY 4 HOURS PRN
Status: DISCONTINUED | OUTPATIENT
Start: 2019-09-03 | End: 2019-09-04 | Stop reason: HOSPADM

## 2019-09-03 RX ORDER — POLYETHYLENE GLYCOL 3350 17 G/17G
17 POWDER, FOR SOLUTION ORAL DAILY
Status: DISCONTINUED | OUTPATIENT
Start: 2019-09-03 | End: 2019-09-04 | Stop reason: HOSPADM

## 2019-09-03 RX ORDER — HYDROMORPHONE HYDROCHLORIDE 2 MG/1
1 TABLET ORAL EVERY 4 HOURS PRN
Status: DISCONTINUED | OUTPATIENT
Start: 2019-09-03 | End: 2019-09-04

## 2019-09-03 RX ORDER — OXYCODONE HYDROCHLORIDE AND ACETAMINOPHEN 5; 325 MG/1; MG/1
2 TABLET ORAL EVERY 4 HOURS PRN
Status: DISCONTINUED | OUTPATIENT
Start: 2019-09-03 | End: 2019-09-03

## 2019-09-03 RX ADMIN — HYDROMORPHONE HYDROCHLORIDE 1 MG: 2 TABLET ORAL at 15:02

## 2019-09-03 RX ADMIN — OXYCODONE HYDROCHLORIDE AND ACETAMINOPHEN 1 TABLET: 5; 325 TABLET ORAL at 06:04

## 2019-09-03 RX ADMIN — HYDROMORPHONE HYDROCHLORIDE 1 MG: 1 INJECTION, SOLUTION INTRAMUSCULAR; INTRAVENOUS; SUBCUTANEOUS at 00:02

## 2019-09-03 RX ADMIN — Medication 10 ML: at 09:14

## 2019-09-03 RX ADMIN — CYCLOBENZAPRINE HYDROCHLORIDE 5 MG: 10 TABLET, FILM COATED ORAL at 09:13

## 2019-09-03 RX ADMIN — CYCLOBENZAPRINE HYDROCHLORIDE 5 MG: 10 TABLET, FILM COATED ORAL at 21:15

## 2019-09-03 RX ADMIN — OXYCODONE HYDROCHLORIDE AND ACETAMINOPHEN 1 TABLET: 5; 325 TABLET ORAL at 10:43

## 2019-09-03 RX ADMIN — PANTOPRAZOLE SODIUM 40 MG: 40 TABLET, DELAYED RELEASE ORAL at 06:04

## 2019-09-03 RX ADMIN — KETOROLAC TROMETHAMINE 15 MG: 30 INJECTION, SOLUTION INTRAMUSCULAR; INTRAVENOUS at 06:04

## 2019-09-03 RX ADMIN — SENNOSIDES 8.6 MG: 8.6 TABLET, FILM COATED ORAL at 21:15

## 2019-09-03 RX ADMIN — ENOXAPARIN SODIUM 40 MG: 40 INJECTION SUBCUTANEOUS at 09:19

## 2019-09-03 RX ADMIN — POLYETHYLENE GLYCOL (3350) 17 G: 17 POWDER, FOR SOLUTION ORAL at 12:46

## 2019-09-03 RX ADMIN — CYCLOBENZAPRINE HYDROCHLORIDE 5 MG: 10 TABLET, FILM COATED ORAL at 14:00

## 2019-09-03 RX ADMIN — HYDROMORPHONE HYDROCHLORIDE 1 MG: 2 TABLET ORAL at 19:22

## 2019-09-03 RX ADMIN — ACETAMINOPHEN 1000 MG: 500 TABLET ORAL at 09:13

## 2019-09-03 RX ADMIN — HYDROMORPHONE HYDROCHLORIDE 1 MG: 1 INJECTION, SOLUTION INTRAMUSCULAR; INTRAVENOUS; SUBCUTANEOUS at 23:14

## 2019-09-03 RX ADMIN — Medication 10 ML: at 00:02

## 2019-09-03 RX ADMIN — Medication 1 TABLET: at 09:13

## 2019-09-03 RX ADMIN — OXYCODONE HYDROCHLORIDE AND ACETAMINOPHEN 2 TABLET: 5; 325 TABLET ORAL at 21:15

## 2019-09-03 RX ADMIN — KETOROLAC TROMETHAMINE 15 MG: 30 INJECTION, SOLUTION INTRAMUSCULAR; INTRAVENOUS at 12:45

## 2019-09-03 ASSESSMENT — PAIN SCALES - GENERAL
PAINLEVEL_OUTOF10: 9
PAINLEVEL_OUTOF10: 8
PAINLEVEL_OUTOF10: 8
PAINLEVEL_OUTOF10: 6
PAINLEVEL_OUTOF10: 8
PAINLEVEL_OUTOF10: 6
PAINLEVEL_OUTOF10: 6
PAINLEVEL_OUTOF10: 8
PAINLEVEL_OUTOF10: 9
PAINLEVEL_OUTOF10: 8

## 2019-09-03 NOTE — PLAN OF CARE
Problem: Pain:  Description  Pain management should include both nonpharmacologic and pharmacologic interventions.   Goal: Pain level will decrease  Outcome: Ongoing  Goal: Control of acute pain  Outcome: Ongoing  Goal: Control of chronic pain  Outcome: Ongoing

## 2019-09-03 NOTE — PROGRESS NOTES
Hospitalist Progress Note      Name:  Karen Spears /Age/Sex: 1945  (69 y.o. female)   MRN & CSN:  [id number] & [id number] Admission Date/Time: 2019  9:06 AM   [facility]:  [facility]- PCP: Maryellen Fitzpatrick, [address] Day: 3    Assessment and Plan:   Karen Spears is a 69 y.o.  female  who presents with sacral fracture    1. Sacral fracture: Due to trauma. CT showed bilateral sacral fractures which could be acute or subacute. Currently on PRN Dilaudid and oral Percocet. Oral percocet not controlling pain enough so will switch to oral dilaudid as needed. Has lidocaine patch and Flexeril. Bowel regimen. Orthopedic surgery on consult - yet to see patient. 2. Bilateral radial fracture: Status post splint. Was seen by orthopedic surgery as outpatient. Plan to remove the splint next week. 3. Hyponatremia: Resolved. 4. Osteoporosis:     Diet DIET GENERAL;   DVT Prophylaxis [] Lovenox, [x]  Heparin, [] SCDs, [] Warfarin  [] NOAC     GI Prophylaxis [x] PPI,  [] H2 Blocker,  [] Carafate,  [] Diet/Tube Feeds   Code Status Full Code   MDM [] Low, [x] Moderate,[]  High     History of Present Illness:     Chief Complaint: Sacral fracture    She was seen and examined today. Still complaining of 10/10 back pain when effects of IV dilaudid wears off. No lower extremity weakness, urinary incontinence or bowel incontinence. Ten point ROS reviewed negative, unless as noted above    Objective: Intake/Output Summary (Last 24 hours) at 9/3/2019 1300  Last data filed at 2019 2116  Gross per 24 hour   Intake 10 ml   Output --   Net 10 ml      Vitals:   Vitals:    19 1003   BP:    Pulse: 84   Resp:    Temp:    SpO2:      Physical Exam:   GEN Awake female, sitting upright in bed in moderate painful distress. RESP Clear to auscultation, no wheezes, rales or rhonchi. Symmetric chest movement while on room air. CARDIO/VASC S1/S2 auscultated.  Regular rate without appreciable

## 2019-09-04 VITALS
HEART RATE: 78 BPM | WEIGHT: 121.5 LBS | TEMPERATURE: 97.5 F | OXYGEN SATURATION: 95 % | HEIGHT: 61 IN | RESPIRATION RATE: 16 BRPM | SYSTOLIC BLOOD PRESSURE: 127 MMHG | BODY MASS INDEX: 22.94 KG/M2 | DIASTOLIC BLOOD PRESSURE: 59 MMHG

## 2019-09-04 PROCEDURE — 99219 PR INITIAL OBSERVATION CARE/DAY 50 MINUTES: CPT | Performed by: PHYSICIAN ASSISTANT

## 2019-09-04 PROCEDURE — 6370000000 HC RX 637 (ALT 250 FOR IP): Performed by: INTERNAL MEDICINE

## 2019-09-04 PROCEDURE — G0378 HOSPITAL OBSERVATION PER HR: HCPCS

## 2019-09-04 PROCEDURE — 27197 CLSD TX PELVIC RING FX: CPT | Performed by: PHYSICIAN ASSISTANT

## 2019-09-04 PROCEDURE — 6370000000 HC RX 637 (ALT 250 FOR IP): Performed by: NURSE PRACTITIONER

## 2019-09-04 RX ORDER — GABAPENTIN 300 MG/1
300 CAPSULE ORAL DAILY
Qty: 30 CAPSULE | Refills: 0 | Status: SHIPPED | OUTPATIENT
Start: 2019-09-04 | End: 2022-04-22

## 2019-09-04 RX ORDER — ACETAMINOPHEN 80 MG
TABLET,CHEWABLE ORAL
Status: COMPLETED
Start: 2019-09-04 | End: 2019-09-04

## 2019-09-04 RX ORDER — HYDROMORPHONE HYDROCHLORIDE 2 MG/1
2 TABLET ORAL EVERY 8 HOURS PRN
Qty: 10 TABLET | Refills: 0 | Status: SHIPPED | OUTPATIENT
Start: 2019-09-04 | End: 2019-09-08

## 2019-09-04 RX ORDER — POLYETHYLENE GLYCOL 3350 17 G/17G
17 POWDER, FOR SOLUTION ORAL DAILY
Qty: 527 G | Refills: 0 | Status: SHIPPED | OUTPATIENT
Start: 2019-09-04 | End: 2019-10-05

## 2019-09-04 RX ORDER — GABAPENTIN 300 MG/1
300 CAPSULE ORAL DAILY
Status: DISCONTINUED | OUTPATIENT
Start: 2019-09-04 | End: 2019-09-04 | Stop reason: HOSPADM

## 2019-09-04 RX ORDER — HYDROMORPHONE HYDROCHLORIDE 2 MG/1
2 TABLET ORAL EVERY 4 HOURS PRN
Status: DISCONTINUED | OUTPATIENT
Start: 2019-09-04 | End: 2019-09-04 | Stop reason: HOSPADM

## 2019-09-04 RX ORDER — ACETAMINOPHEN 80 MG
TABLET,CHEWABLE ORAL
Status: DISCONTINUED
Start: 2019-09-04 | End: 2019-09-04 | Stop reason: HOSPADM

## 2019-09-04 RX ADMIN — OXYCODONE HYDROCHLORIDE AND ACETAMINOPHEN 2 TABLET: 5; 325 TABLET ORAL at 01:12

## 2019-09-04 RX ADMIN — Medication 1 TABLET: at 11:34

## 2019-09-04 RX ADMIN — Medication: at 11:24

## 2019-09-04 RX ADMIN — HYDROMORPHONE HYDROCHLORIDE 1 MG: 2 TABLET ORAL at 07:36

## 2019-09-04 RX ADMIN — GABAPENTIN 300 MG: 300 CAPSULE ORAL at 11:34

## 2019-09-04 RX ADMIN — ACETAMINOPHEN 1000 MG: 500 TABLET ORAL at 11:36

## 2019-09-04 RX ADMIN — POLYETHYLENE GLYCOL (3350) 17 G: 17 POWDER, FOR SOLUTION ORAL at 12:19

## 2019-09-04 RX ADMIN — PANTOPRAZOLE SODIUM 40 MG: 40 TABLET, DELAYED RELEASE ORAL at 05:18

## 2019-09-04 RX ADMIN — CYCLOBENZAPRINE HYDROCHLORIDE 5 MG: 10 TABLET, FILM COATED ORAL at 11:35

## 2019-09-04 RX ADMIN — HYDROMORPHONE HYDROCHLORIDE 1 MG: 2 TABLET ORAL at 03:25

## 2019-09-04 RX ADMIN — OXYCODONE HYDROCHLORIDE AND ACETAMINOPHEN 2 TABLET: 5; 325 TABLET ORAL at 05:18

## 2019-09-04 ASSESSMENT — PAIN SCALES - GENERAL
PAINLEVEL_OUTOF10: 8
PAINLEVEL_OUTOF10: 6
PAINLEVEL_OUTOF10: 9
PAINLEVEL_OUTOF10: 2
PAINLEVEL_OUTOF10: 8
PAINLEVEL_OUTOF10: 8
PAINLEVEL_OUTOF10: 3
PAINLEVEL_OUTOF10: 6
PAINLEVEL_OUTOF10: 7
PAINLEVEL_OUTOF10: 9

## 2019-09-04 ASSESSMENT — PAIN DESCRIPTION - PROGRESSION
CLINICAL_PROGRESSION: NOT CHANGED
CLINICAL_PROGRESSION: NOT CHANGED

## 2019-09-04 ASSESSMENT — ENCOUNTER SYMPTOMS
ALLERGIC/IMMUNOLOGIC NEGATIVE: 1
RESPIRATORY NEGATIVE: 1
GASTROINTESTINAL NEGATIVE: 1
BACK PAIN: 1
EYES NEGATIVE: 1

## 2019-09-04 ASSESSMENT — PAIN DESCRIPTION - PAIN TYPE: TYPE: ACUTE PAIN

## 2019-09-04 ASSESSMENT — PAIN - FUNCTIONAL ASSESSMENT: PAIN_FUNCTIONAL_ASSESSMENT: PREVENTS OR INTERFERES SOME ACTIVE ACTIVITIES AND ADLS

## 2019-09-04 ASSESSMENT — PAIN DESCRIPTION - DESCRIPTORS: DESCRIPTORS: ACHING;SHARP

## 2019-09-04 ASSESSMENT — PAIN DESCRIPTION - ONSET: ONSET: ON-GOING

## 2019-09-04 ASSESSMENT — PAIN DESCRIPTION - LOCATION: LOCATION: COCCYX

## 2019-09-04 ASSESSMENT — PAIN DESCRIPTION - FREQUENCY: FREQUENCY: CONTINUOUS

## 2019-09-04 NOTE — CONSULTS
ORTHOPEDIC CONSULT      2019    Patient name: Noemi De León  : 1945    CHIEF COMPLAINT  Chief Complaint   Patient presents with    Back Pain     pt reports fall 5 weeks ago, fell backward onto cement, pt c/o pain from waist down to feet, pt had xrays Friday, \"cracked sacrum\"       HPI  The patient was seen and examined. Noemi De León is a 69 y.o. female who presents with increasing low back/tailbone pain following a fall nearly 6 weeks ago. Imaging demonstrates bilateral sacral fractures. History of osteoporosis. Bilateral distal radius fractures treated non-op by Dr. Desiree Medina. Reports some mild paresthesias/pain down left leg. No urinary or fecal incontinence. Patient has been weightbearing since injury, but finds it difficult to find a comfortable position seated, lying or standing. Reports various pain medications have not been helpful. Patient is ambulating around room during interview. PAST MEDICAL HISTORY  Past Medical History:   Diagnosis Date    Gyn exam     per Dr. Yuliana Rae History of colonoscopy 10/2008    per Dr. Hair Rangel few diverticula otherwise normal 10yr fup recommended    Osteoporosis     Shingles     s/p episode    White matter changes     chronic ischemic changes       CURRENT MEDICATIONS  Prior to Admission medications    Medication Sig Start Date End Date Taking? Authorizing Provider   risedronate (ACTONEL) 35 MG tablet TAKE 1 TABLET BY MOUTH ONCE WEEKLY BEFORE BREAKFAST, ON AN EMPTY STOMACH: REMAIN UPRIGHT FOR 30 MINUTES:TAKE WITH 8 OUNCES OF WATER 19  Yes Karie Renee MD   calcipotriene (DOVONEX) 0.005 % ointment Apply topically to affected areas (elbows, back, knees) 2 times daily.  19  Yes Karie Renee MD   folic acid-pyridoxine-cyancobalamin (FOLBIC) 2.5-25-2 MG TABS Take 1 tablet by mouth daily 19  Yes Karie Renee MD   hydrocortisone [facility]) 0.2 % cream Apply twice daily as needed 18  Yes Karie Renee MD   [address] [facility] fractures in the setting of underlying insufficiency fractures. 2. No acute inflammatory process.  Normal appendix. 3. Mild gallbladder distention. XR LUMBAR 8/30/19 - Impression   1. Angulated appearance of the sacrum at the level of S3 which is compatible   with fracture and most suggestive of acute fracture given patient history. No comparison exam available. 2. Multilevel degenerative change of the lumbar spine most pronounced   distally. 3. Osteopenia.             ASSESSMENT     Patient Active Problem List   Diagnosis    Mixed hyperlipidemia    Osteopenia    Sacral insufficiency fracture        69 y.o. female with bilateral sacral fractures  PLAN   1. Closed treatment, non-op  2. PT/OT: WBAT  3. Discussed positioning, cushions and pain relief modalities  4. Follow-up with Dr. Gopal Jimenez in 4 weeks. Patient may choose follow-up with established orthopod, Dr. Matthew Hudson. Discussed with Dr. Ishan Crenshaw.      Electronically signed by: Jack Malin PA-C, 9/4/2019 7:25 AM

## 2019-09-04 NOTE — DISCHARGE SUMMARY
FALL  Relevant Medical/Surgical History: NONE ; ORDERING SYSTEM PROVIDED HISTORY:  low back and pelvic pain worsening  TECHNOLOGIST PROVIDED HISTORY:  Reason for Exam: low back and pelvic pain worsening  Acuity: Acute  Type of Exam: Initial  Mechanism of Injury: FALL  Relevant Medical/Surgical History: 75 ML ISOVUE 370    FINDINGS:  CT abdomen and pelvis:    Lower Chest: The heart size is upper normal.  There is mild scarring in the  right middle lobe and lingula. Organs: There is a tiny 6 mm cyst within the medial segment of the left lobe  of the liver.  The gallbladder is mildly distended.  The spleen is  unremarkable. Kartik Fee is a small hiatal hernia. Kartik Fee is no focal pancreatic  abnormality.  The adrenal glands are within normal limits. The kidneys are not obstructed.  There are no urinary tract calculi. GI/Bowel: The bowel is not obstructed.  The appendix is normal.  There is  stool throughout the colon. Pelvis: There is no free fluid in the pelvis.  The urinary bladder is  unremarkable. Peritoneum/Retroperitoneum: There is moderate atherosclerotic disease. Kartik Fee  is no free intraperitoneal air.  There is a small fat-containing  periumbilical hernia. Bones/Soft Tissues: Bilateral breast implants are noted.  Bilateral sacral  fractures are present, as mentioned below.  No additional fracture is  identified. CT lumbar spine: There are acute or subacute bilateral sacral fractures with disruption of the  anterior cortex.  There is associated sclerosis suggesting a subacute or  chronic component.  There is degenerative change of the sacroiliac joints  which remain normally aligned. There is mild multilevel lumbar spine degenerative disc disease, most  pronounced at L5-S1.  There is mild facet degenerative change.  There is no  spondylolysis or spondylolisthesis.     Impression:       1.  Bilateral sacral fractures which appear to reflect acute or subacute  fractures in the setting of underlying insufficiency fractures. 2. No acute inflammatory process.  Normal appendix. 3. Mild gallbladder distention.     CT LUMBAR SPINE WO CONTRAST [[id number]] Collected: 09/01/19 1119     Order Status: Completed Updated: 09/03/19 1056     Narrative:       EXAMINATION:  CT OF THE LUMBAR SPINE WITHOUT CONTRAST; CT OF THE ABDOMEN AND PELVIS WITH  CONTRAST 9/1/2019 10:40 am    TECHNIQUE:  CT of the lumbar spine was performed without the administration of  intravenous contrast. Multiplanar reformatted images are provided for review. Dose modulation, iterative reconstruction, and/or weight based adjustment of  the mA/kV was utilized to reduce the radiation dose to as low as reasonably  achievable.; CT of the abdomen and pelvis was performed with the  administration of intravenous contrast. Multiplanar reformatted images are  provided for review. Dose modulation, iterative reconstruction, and/or weight  based adjustment of the mA/kV was utilized to reduce the radiation dose to as  low as reasonably achievable. COMPARISON:  None. HISTORY:  ORDERING SYSTEM PROVIDED HISTORY: fall  TECHNOLOGIST PROVIDED HISTORY:  Reason for exam:->fall  Reason for Exam: fall; worsening pain  Acuity: Acute  Type of Exam: Initial  Mechanism of Injury: FALL  Relevant Medical/Surgical History: NONE ; ORDERING SYSTEM PROVIDED HISTORY:  low back and pelvic pain worsening  TECHNOLOGIST PROVIDED HISTORY:  Reason for Exam: low back and pelvic pain worsening  Acuity: Acute  Type of Exam: Initial  Mechanism of Injury: FALL  Relevant Medical/Surgical History: 75 ML ISOVUE 370    FINDINGS:  CT abdomen and pelvis:    Lower Chest: The heart size is upper normal.  There is mild scarring in the  right middle lobe and lingula. Organs:  There is a tiny 6 mm cyst within the medial segment of the left lobe  of the liver.  The gallbladder is mildly distended.  The spleen is  unremarkable. Gladystine Or is a small hiatal hernia. Gladystine Or is no focal

## 2019-09-24 ENCOUNTER — HOSPITAL ENCOUNTER (OUTPATIENT)
Dept: OCCUPATIONAL THERAPY | Age: 74
Setting detail: THERAPIES SERIES
Discharge: HOME | End: 2019-09-24
Payer: MEDICARE

## 2019-09-24 PROCEDURE — 97022 WHIRLPOOL THERAPY: CPT

## 2019-09-24 PROCEDURE — 95832 HC OT HAND EVALUATION: CPT

## 2019-09-24 PROCEDURE — 97530 THERAPEUTIC ACTIVITIES: CPT

## 2019-09-24 PROCEDURE — 97110 THERAPEUTIC EXERCISES: CPT

## 2019-09-24 NOTE — PROGRESS NOTES
Edema Right  Left    Hand Volumeter     Circumference: Palm      Wrist Crease     Base of Index     Base of Long     Base of Ring     Base of Small                            MILD EDEMA AT B WRISTS  OTHER: Fluidotherapy, ROM, strengthening B hands and wrist  Dupuytren's contracture of PIP RSF. Had surgery last year and was wearing splint at night until couldn't because of splints for wrist fracture. Contracture worsened  Barriers to Learning:            No barriers noted              Assessment   Assessment  Performance deficits / Impairments: Decreased ROM; Decreased strength  Assessment:  Pt presents with Bilateral wrist pain. Mild edema at wrists. Sensation intact. Decreased ROM B wrists. Decreased  and pinch  Treatment Diagnosis: M79.641  M79.642  Prognosis: Good  Decision Making: Medium Complexity  History: PMH: osteoporosis  Meds: Actonel  Exam: ROM, strength, pain, edema, sensation  Assistance / Modification: Mod  REQUIRES OT FOLLOW UP: Yes       Plan   Plan  Times per week: 3  Plan weeks: 6  Current Treatment Recommendations: ROM, Strengthening, Modalities (comment), Manual Therapy:  STM    OutComes Score  QUICK DASH 65.9  Goals   Pt will decrease pain to 1-2/10 with use to increase functional activity tolerance. Pt will increase AROM B wrists to WNL to increase functional mobility    Pt will increase B  15-20# to increase functional grasp   Pt will follow thru and be independent with HEP      LTG  Pt will decrease Quick dash below 30 for significant performance improvement.     Therapy Time   Individual Concurrent Group Co-treatment   Time In 0930         Time Out 1030         Minutes [address], OTRL CHT

## 2019-09-27 ENCOUNTER — HOSPITAL ENCOUNTER (OUTPATIENT)
Dept: OCCUPATIONAL THERAPY | Age: 74
Setting detail: THERAPIES SERIES
Discharge: HOME | End: 2019-09-27
Payer: MEDICARE

## 2019-09-27 PROCEDURE — 97530 THERAPEUTIC ACTIVITIES: CPT

## 2019-09-27 PROCEDURE — 97022 WHIRLPOOL THERAPY: CPT

## 2019-09-27 PROCEDURE — 97110 THERAPEUTIC EXERCISES: CPT

## 2019-09-30 ENCOUNTER — HOSPITAL ENCOUNTER (OUTPATIENT)
Dept: OCCUPATIONAL THERAPY | Age: 74
Setting detail: THERAPIES SERIES
Discharge: HOME | End: 2019-09-30
Payer: MEDICARE

## 2019-09-30 PROCEDURE — 97022 WHIRLPOOL THERAPY: CPT

## 2019-09-30 PROCEDURE — 97110 THERAPEUTIC EXERCISES: CPT

## 2019-09-30 PROCEDURE — 97530 THERAPEUTIC ACTIVITIES: CPT

## 2019-10-02 ENCOUNTER — HOSPITAL ENCOUNTER (OUTPATIENT)
Dept: OCCUPATIONAL THERAPY | Age: 74
Setting detail: THERAPIES SERIES
Discharge: HOME | End: 2019-10-02
Payer: MEDICARE

## 2019-10-02 PROCEDURE — 97530 THERAPEUTIC ACTIVITIES: CPT

## 2019-10-02 PROCEDURE — 97110 THERAPEUTIC EXERCISES: CPT

## 2019-10-02 PROCEDURE — 97022 WHIRLPOOL THERAPY: CPT

## 2019-10-07 ENCOUNTER — HOSPITAL ENCOUNTER (OUTPATIENT)
Dept: OCCUPATIONAL THERAPY | Age: 74
Setting detail: THERAPIES SERIES
Discharge: HOME | End: 2019-10-07
Payer: MEDICARE

## 2019-10-07 PROCEDURE — 97530 THERAPEUTIC ACTIVITIES: CPT

## 2019-10-07 PROCEDURE — 97110 THERAPEUTIC EXERCISES: CPT

## 2019-10-07 PROCEDURE — 97022 WHIRLPOOL THERAPY: CPT

## 2019-10-10 ENCOUNTER — HOSPITAL ENCOUNTER (OUTPATIENT)
Dept: OCCUPATIONAL THERAPY | Age: 74
Setting detail: THERAPIES SERIES
Discharge: HOME | End: 2019-10-10
Payer: MEDICARE

## 2019-10-10 PROCEDURE — 97022 WHIRLPOOL THERAPY: CPT

## 2019-10-10 PROCEDURE — 97530 THERAPEUTIC ACTIVITIES: CPT

## 2019-10-10 PROCEDURE — 97110 THERAPEUTIC EXERCISES: CPT

## 2019-10-15 ENCOUNTER — HOSPITAL ENCOUNTER (OUTPATIENT)
Dept: OCCUPATIONAL THERAPY | Age: 74
Setting detail: THERAPIES SERIES
Discharge: HOME | End: 2019-10-15
Payer: MEDICARE

## 2019-10-15 PROCEDURE — 97110 THERAPEUTIC EXERCISES: CPT

## 2019-10-15 PROCEDURE — 97022 WHIRLPOOL THERAPY: CPT

## 2019-10-15 PROCEDURE — 97140 MANUAL THERAPY 1/> REGIONS: CPT

## 2019-10-18 ENCOUNTER — HOSPITAL ENCOUNTER (OUTPATIENT)
Dept: OCCUPATIONAL THERAPY | Age: 74
Setting detail: THERAPIES SERIES
Discharge: HOME | End: 2019-10-18
Payer: MEDICARE

## 2019-10-18 PROCEDURE — 97022 WHIRLPOOL THERAPY: CPT

## 2019-10-18 PROCEDURE — 97110 THERAPEUTIC EXERCISES: CPT

## 2019-10-18 PROCEDURE — 97530 THERAPEUTIC ACTIVITIES: CPT

## 2019-10-22 ENCOUNTER — HOSPITAL ENCOUNTER (OUTPATIENT)
Dept: OCCUPATIONAL THERAPY | Age: 74
Setting detail: THERAPIES SERIES
Discharge: HOME | End: 2019-10-22
Payer: MEDICARE

## 2019-10-22 PROCEDURE — 97022 WHIRLPOOL THERAPY: CPT

## 2019-10-22 PROCEDURE — 97140 MANUAL THERAPY 1/> REGIONS: CPT

## 2019-10-22 PROCEDURE — 97110 THERAPEUTIC EXERCISES: CPT

## 2019-10-25 ENCOUNTER — HOSPITAL ENCOUNTER (OUTPATIENT)
Dept: OCCUPATIONAL THERAPY | Age: 74
Setting detail: THERAPIES SERIES
Discharge: HOME | End: 2019-10-25
Payer: MEDICARE

## 2019-10-25 PROCEDURE — 97022 WHIRLPOOL THERAPY: CPT

## 2019-10-25 PROCEDURE — 97530 THERAPEUTIC ACTIVITIES: CPT

## 2019-10-25 PROCEDURE — 97110 THERAPEUTIC EXERCISES: CPT

## 2019-10-29 ENCOUNTER — HOSPITAL ENCOUNTER (OUTPATIENT)
Dept: OCCUPATIONAL THERAPY | Age: 74
Setting detail: THERAPIES SERIES
Discharge: HOME | End: 2019-10-29
Payer: MEDICARE

## 2019-10-29 PROCEDURE — 97022 WHIRLPOOL THERAPY: CPT

## 2019-10-29 PROCEDURE — 97530 THERAPEUTIC ACTIVITIES: CPT

## 2019-10-29 PROCEDURE — 97110 THERAPEUTIC EXERCISES: CPT

## 2019-11-01 ENCOUNTER — HOSPITAL ENCOUNTER (OUTPATIENT)
Dept: OCCUPATIONAL THERAPY | Age: 74
Setting detail: THERAPIES SERIES
Discharge: HOME | End: 2019-11-01
Payer: MEDICARE

## 2019-11-01 PROCEDURE — 97110 THERAPEUTIC EXERCISES: CPT

## 2019-11-01 PROCEDURE — 97022 WHIRLPOOL THERAPY: CPT

## 2019-11-01 PROCEDURE — 97530 THERAPEUTIC ACTIVITIES: CPT

## 2021-03-23 ENCOUNTER — HOSPITAL ENCOUNTER (OUTPATIENT)
Dept: PHYSICAL THERAPY | Age: 76
Setting detail: THERAPIES SERIES
Discharge: HOME | End: 2021-03-23
Payer: MEDICARE

## 2021-03-23 PROCEDURE — 97016 VASOPNEUMATIC DEVICE THERAPY: CPT

## 2021-03-23 PROCEDURE — 97161 PT EVAL LOW COMPLEX 20 MIN: CPT

## 2021-03-23 PROCEDURE — 97110 THERAPEUTIC EXERCISES: CPT

## 2021-03-23 ASSESSMENT — PAIN DESCRIPTION - FREQUENCY: FREQUENCY: CONTINUOUS

## 2021-03-23 ASSESSMENT — PAIN DESCRIPTION - PROGRESSION: CLINICAL_PROGRESSION: NOT CHANGED

## 2021-03-23 ASSESSMENT — PAIN DESCRIPTION - ORIENTATION: ORIENTATION: RIGHT

## 2021-03-23 NOTE — FLOWSHEET NOTE
Outpatient Physical Therapy  [place]           [x] Phone: [phone]   Fax: [phone]  [place]           [] Phone: [phone]   Fax: [phone]        Physical Therapy Daily Treatment Note  Date:  3/23/2021    Patient Name:  Zoe Parrish    :  1945  MRN: [id number]  Restrictions/Precautions: Other position/activity restrictions: none  Diagnosis:   Diagnosis: R shoulder/deltoid pain  Date of Injury/Surgery:   Treatment Diagnosis: Treatment Diagnosis: R shoulder pain, stiffness, weakness, inflammation    Insurance/Certification information: PT Insurance Information: [id number]   Referring Physician:  Referring Practitioner: Janas Aase  Next Doctor Visit:    Plan of care signed (Y/N):  pending  Outcome Measure: DASH 66%  Visit# / total visits:    POC  Pain level: 3/10   Goals:       Short term goals  Time Frame for Short term goals: 3 weeks  Short term goal 1: Pt will be able to report at least 25% reduction in pain  Short term goal 2: Pt will be able to advance scap adn RTC work w/o increased pain  Short term goal 3: Pt will be able to report improved sleep  Long term goals  Time Frame for Long term goals : 6 weeks  Long term goal 1: Pt will be independent w/ HEP and able to continue to manage her pain on her own after PT  Long term goal 2: Pt will be able to sleep w/o shoulder pain  Long term goal 3: Pt will have improved DASH score by 20% or more    Summary of Evaluation: Assessment: Pt is a 79 yo female who presents w/ R shoulder pain. She has no Hx shoulder pain/injury. She demonstrates painful and limited shoulder ROM w/ decreased strength and TTP w/ + impingement signs. These limiations are affecting her sleep and abiltiy to perform usual ADL's. She will benefit from PT to help reduce pain and inflammmation and restore PLOF. Prior to  she had no R shoulder pain. Subjective:  See eval         Any changes in Ambulatory Summary Sheet?   None        Objective:  See eval Prior to today's treatment session, patient was screened for signs and symptoms related to COVID-19 including but not limited to verbally answering questions related to feeling ill, cough, or SOB, along with taking temperature via forehead thermometer. Patient presented with all negative signs and symptoms and had no fever >100 degrees Fahrenheit this date. Exercises: (No more than 4 columns)   Exercise/Equipment 3/23/21  #1 Date Date           WARM UP                     TABLE      Supine press up Cane 10x      Supine flex  Cane 10x      Supine ER @ 90 Cane 10x                    STANDING      Door way stretch pec/bicep  10\" x2 ea, light     scap squeezes  5x, easy                                         PROPRIOCEPTION                                    MODALITIES      Vaso  10'               Other Therapeutic Activities/Education:  3/23/2021: Patient received education on their current pathology and how their condition effects them with their functional activities. Patient understood discussion and questions were answered. Patient understands their activity limitations and understands rational for treatment progression. Home Exercise Program:  Issued, practiced and pt demo ability to perform 3/23/2021         Manual Treatments:  [address] mobs inf and post, light PROM R shoulder       Modalities:  Vaso to R shoulder, pt seated w/ arm on pillow, 10'       Communication with other providers:  POC faxed 3/23/21      Assessment:  Pt is a 79 yo female who presents w/ R shoulder pain. She has no Hx shoulder pain/injury. She demonstrates painful and limited shoulder ROM w/ decreased strength and TTP w/ + impingement signs. These limiations are affecting her sleep and abiltiy to perform usual ADL's. She will benefit from PT to help reduce pain and inflammmation and restore PLOF. Prior to 2021 she had no R shoulder pain.       Plan for Next Session: continue manual work to R shoulder for joint mobs and ROM for pain control and gentle motion, advance ROM and stretching to ruma, scap stability as able also, continue vaso/ice prn w/ educ for home      Time In / Time Out:    7545/2751       Timed Code/Total Treatment Minutes:  15/50  1 TE 15', 1 Vaso 10', 1 Eval 25'      Next Progress Note due:  Visit 10      Plan of Care Interventions:  [x] Therapeutic Exercise  [x] Modalities:  [x] Therapeutic Activity     [] Ultrasound  [] Estim  [] Gait Training      [] Cervical Traction [] Lumbar Traction  [x] Neuromuscular Re-education    [x] Cold/hotpack [] Iontophoresis   [x] Instruction in HEP      [x] Vasopneumatic   [] Dry Needling    [x] Manual Therapy               [] Aquatic Therapy              Electronically signed by:  Marilu Tejeda, PT, MPT, ATC  3/23/2021, 12:19 PM    3/23/2021, 12:22 PM

## 2021-03-23 NOTE — PROGRESS NOTES
Physical Therapy  Initial Assessment  Date: 3/23/2021  Patient Name: Marii Campos  MRN: [id number]  : 1945     Treatment Diagnosis: R shoulder pain, stiffness, weakness, inflammation    Restrictions  Position Activity Restriction  Other position/activity restrictions: none    Subjective   General  Chart Reviewed: Yes  Patient assessed for rehabilitation services?: Yes  Referring Practitioner: Romeo Arroyo  Diagnosis: R shoulder/deltoid pain  PT Visit Information  PT Insurance Information: [facility] [employer]  Subjective  Subjective: pain about 6-7 weeks, maybe some lifting started the irritation, no xrays or injections, thought would improve but has not, no prior Hx shoulder injury. Pain increased w/ most movments and sometimes even at rest.  Hurts to lay on it, can interrupt sleep at times. Gets relief w/ Tylenol, NSIAD's, CBD ointment, lidocaine patch. Hasn't tried heat or ice  Pain Screening  Patient Currently in Pain: Yes  Pain Assessment  Pain Assessment: 0-10  Pain Level: 3(max pain 8/10)  Patient's Stated Pain Goal: No pain  Pain Type: Acute pain  Pain Location: Arm  Pain Orientation: Right  Pain Radiating Towards: to elbow, rarely N/T  Pain Descriptors: Aching;Burning;Radiating; Sharp  Pain Frequency: Continuous  Pain Onset: Progressive  Clinical Progression: Not changed  Functional Pain Assessment: Prevents or interferes with all active and some passive activities  Vital Signs  Patient Currently in Pain: Yes    Vision/Hearing  Vision  Vision: Impaired    Orientation  Orientation  Overall Orientation Status: Within Normal Limits    Social/Functional History  Social/Functional History  Lives With: Spouse  Type of Home: House  Occupation: Retired  Leisure & Hobbies: walking, going to soccer games    Objective     Observation/Palpation  Palpation: TTP along bicep tendons and anterior shoulder, min sub acrom    PROM RUE (degrees)  RUE General PROM: supine shoulder flex 160, abd 165, IR full, ER 80 all w/ pain end for home  Current Treatment Recommendations: Strengthening, ROM, Functional Mobility Training, Modalities, Pain Management, Manual Therapy - Joint Manipulation, Manual Therapy - Soft Tissue Mobilization, Neuromuscular Re-education, Home Exercise Program, Patient/Caregiver Education & Training      OutComes Score     DASH 66%    Goals  Short term goals  Time Frame for Short term goals: 3 weeks  Short term goal 1: Pt will be able to report at least 25% reduction in pain  Short term goal 2: Pt will be able to advance scap adn RTC work w/o increased pain  Short term goal 3: Pt will be able to report improved sleep  Long term goals  Time Frame for Long term goals : 6 weeks  Long term goal 1: Pt will be independent w/ HEP and able to continue to manage her pain on her own after PT  Long term goal 2: Pt will be able to sleep w/o shoulder pain  Long term goal 3: Pt will have improved DASH score by 20% or more  Patient Goals   Patient goals : eliminate pain         Sommer Carter, PT   PT, MPT, ATC     3/23/2021, 12:18 PM

## 2021-03-23 NOTE — PLAN OF CARE
Needling  [x] Manual Therapy               [] Aquatic Therapy       Other:          Frequency/Duration:  # Days per week: [] 1 day # Weeks: [] 1 week [] 5 weeks     [x] 2 days   [] 2 weeks [x] 6 weeks     [] 3 days   [] 3 weeks [] 7 weeks     [] 4 days   [] 4 weeks [] 8 weeks         [] 9 weeks [] 10 weeks         [] 11 weeks [] 12 weeks    Rehab Potential/Progress: [] Excellent [x] Good [] Fair  [] Poor     Goals:      Short term goals  Time Frame for Short term goals: 3 weeks  Short term goal 1: Pt will be able to report at least 25% reduction in pain  Short term goal 2: Pt will be able to advance scap adn RTC work w/o increased pain  Short term goal 3: Pt will be able to report improved sleep  Long term goals  Time Frame for Long term goals : 6 weeks  Long term goal 1: Pt will be independent w/ HEP and able to continue to manage her pain on her own after PT  Long term goal 2: Pt will be able to sleep w/o shoulder pain  Long term goal 3: Pt will have improved DASH score by 20% or more      Electronically signed by:  Jennifer Cole, PT, MPT, ATC  3/23/2021, 12:22 PM    3/23/2021, 12:24 PM  If you have any questions or concerns, please don't hesitate to call.   Thank you for your referral.      Physician Signature:________________________________Date:_________ TIME: _____  By signing above, therapists plan is approved by physician

## 2021-03-26 ENCOUNTER — HOSPITAL ENCOUNTER (OUTPATIENT)
Dept: PHYSICAL THERAPY | Age: 76
Setting detail: THERAPIES SERIES
Discharge: HOME | End: 2021-03-26
Payer: MEDICARE

## 2021-03-26 PROCEDURE — 97140 MANUAL THERAPY 1/> REGIONS: CPT

## 2021-03-26 PROCEDURE — 97110 THERAPEUTIC EXERCISES: CPT

## 2021-03-26 NOTE — FLOWSHEET NOTE
Outpatient Physical Therapy  [place]           [x] Phone: [phone]   Fax: [phone]  [place]           [] Phone: [phone]   Fax: [phone]        Physical Therapy Daily Treatment Note  Date:  3/26/2021    Patient Name:  Zoe Parrish    :  1945  MRN: [id number]  Restrictions/Precautions: Other position/activity restrictions: none  Diagnosis:   Diagnosis: R shoulder/deltoid pain  Date of Injury/Surgery:   Treatment Diagnosis: Treatment Diagnosis: R shoulder pain, stiffness, weakness, inflammation    Insurance/Certification information: PT Insurance Information: [id number]   Referring Physician:  Referring Practitioner: Janas Aase  Next Doctor Visit:    Plan of care signed (Y/N): YES  Outcome Measure: DASH 66%  Visit# / total visits:    POC  Pain level: 3-4/10 Pain on both sides of her R upper arm  Goals:       Short term goals  Time Frame for Short term goals: 3 weeks  Short term goal 1: Pt will be able to report at least 25% reduction in pain  Short term goal 2: Pt will be able to advance scap adn RTC work w/o increased pain  Short term goal 3: Pt will be able to report improved sleep  Long term goals  Time Frame for Long term goals : 6 weeks  Long term goal 1: Pt will be independent w/ HEP and able to continue to manage her pain on her own after PT  Long term goal 2: Pt will be able to sleep w/o shoulder pain  Long term goal 3: Pt will have improved DASH score by 20% or more    Summary of Evaluation: Assessment: Pt is a 77 yo female who presents w/ R shoulder pain. She has no Hx shoulder pain/injury. She demonstrates painful and limited shoulder ROM w/ decreased strength and TTP w/ + impingement signs. These limiations are affecting her sleep and abiltiy to perform usual ADL's. She will benefit from PT to help reduce pain and inflammmation and restore PLOF. Prior to  she had no R shoulder pain. Subjective:  Pt stated that her pain was 3-4/10 today.  Pt stated that she has pain in front and back of her upper arm. Pt stated that she has been trying to do her HEP, but that she struggles with doorway stretch and scap squeezes. Pt stated that she has difficulty turning the steering wheel and stated that she can't lift anything with her R UE. Pt stated that she didn't think ice helped, but she is doing it 1 x a day after her HEP. Any changes in Ambulatory Summary Sheet? None        Objective:    Prior to today's treatment session, patient was screened for signs and symptoms related to COVID-19 including but not limited to verbally answering questions related to feeling ill, cough, or SOB, along with taking temperature via forehead thermometer. Patient presented with all negative signs and symptoms and had no fever >100 degrees Fahrenheit this date. Decreased scapular mobility     Exercises: (No more than 4 columns)   Exercise/Equipment 3/23/21  #1 3/26/2021 #2 Date           WARM UP                     TABLE      Supine press up Cane 10x  Cane x 10  (man)   Cane x 10    Supine flex  Cane 10x  Cane x10  (man)   Cane x 10    Supine ER @ 90 Cane 10x AAROM in tolerated range x 15 ea dir  S/L w/ Man scap assist  X 10 ER    Shoulder isometrics   Man 10x5\"   Flex/ ext/ abd/ IR/ ER   also did 1 of ea ( flex/ abd/ Ext) against ball on wall for demo for HEP               STANDING      Door way stretch pec/bicep  10\" x2 ea, light     scap squeezes  5x, easy  10x2  After man                                       PROPRIOCEPTION                                    MODALITIES      Vaso  10' --              Other Therapeutic Activities/Education:  3/23/2021: Patient received education on their current pathology and how their condition effects them with their functional activities. Patient understood discussion and questions were answered. Patient understands their activity limitations and understands rational for treatment progression.         Home Exercise Program:  Issued, practiced and

## 2021-03-30 ENCOUNTER — HOSPITAL ENCOUNTER (OUTPATIENT)
Dept: PHYSICAL THERAPY | Age: 76
Setting detail: THERAPIES SERIES
Discharge: HOME | End: 2021-03-30
Payer: MEDICARE

## 2021-03-30 PROCEDURE — 97140 MANUAL THERAPY 1/> REGIONS: CPT

## 2021-03-30 PROCEDURE — 97112 NEUROMUSCULAR REEDUCATION: CPT

## 2021-03-30 PROCEDURE — 97110 THERAPEUTIC EXERCISES: CPT

## 2021-03-30 NOTE — FLOWSHEET NOTE
Outpatient Physical Therapy  [place]           [x] Phone: [phone]   Fax: [phone]  [place]           [] Phone: [phone]   Fax: [phone]        Physical Therapy Daily Treatment Note  Date:  3/30/2021    Patient Name:  Susie Herman    :  1945  MRN: [id number]  Restrictions/Precautions: Other position/activity restrictions: none  Diagnosis:   Diagnosis: R shoulder/deltoid pain  Date of Injury/Surgery:   Treatment Diagnosis: Treatment Diagnosis: R shoulder pain, stiffness, weakness, inflammation    Insurance/Certification information: PT Insurance Information: [id number]   Referring Physician:  Referring Practitioner: Glenn Sandy  Next Doctor Visit:    Plan of care signed (Y/N): YES  Outcome Measure: DASH 66%  Visit# / total visits:   3/12 POC  Pain level: 3-4/10 With arm movement, 0/10 at rest this morning  Goals:       Short term goals  Time Frame for Short term goals: 3 weeks  Short term goal 1: Pt will be able to report at least 25% reduction in pain  Short term goal 2: Pt will be able to advance scap adn RTC work w/o increased pain  Short term goal 3: Pt will be able to report improved sleep  Long term goals  Time Frame for Long term goals : 6 weeks  Long term goal 1: Pt will be independent w/ HEP and able to continue to manage her pain on her own after PT  Long term goal 2: Pt will be able to sleep w/o shoulder pain  Long term goal 3: Pt will have improved DASH score by 20% or more    Summary of Evaluation: Assessment: Pt is a 77 yo female who presents w/ R shoulder pain. She has no Hx shoulder pain/injury. She demonstrates painful and limited shoulder ROM w/ decreased strength and TTP w/ + impingement signs. These limiations are affecting her sleep and abiltiy to perform usual ADL's. She will benefit from PT to help reduce pain and inflammmation and restore PLOF. Prior to  she had no R shoulder pain. Subjective:  Pt states her shld is feeling about the same. progression. Home Exercise Program:  Issued, practiced and pt demo ability to perform 3/23/2021   Added isometrics ( flexion, extension, and abduction) sub max against wall ( 3/26/2021)      Manual Treatments:  GH mobs inf and post, light PROM R shoulder, STM and scap mobs x 15' total      Modalities:      Communication with other providers:  POC faxed 3/23/21      Assessment:  Pt demonstrated overall good tolerance with today's session. Did have some discomfort with PROM endranges. Fatigues easily with wall exercises. She will benefit from PT to help reduce pain and inflammmation and restore PLOF. Reports of no pain at rest following session. Does report of muscle fatigue.       Plan for Next Session: continue manual work to R shoulder for joint mobs and ROM for pain control and gentle motion, advance ROM and stretching to ruma, scap stability as able also, continue vaso/ice prn w/ educ for home      Time In / Time Out:    6609/4112      Timed Code/Total Treatment Minutes: 40/40, (1) TE, (1) NR, (1) MT      Next Progress Note due:  Visit 10      Plan of Care Interventions:  [x] Therapeutic Exercise  [x] Modalities:  [x] Therapeutic Activity     [] Ultrasound  [] Estim  [] Gait Training      [] Cervical Traction [] Lumbar Traction  [x] Neuromuscular Re-education    [x] Cold/hotpack [] Iontophoresis   [x] Instruction in HEP      [x] Vasopneumatic   [] Dry Needling    [x] Manual Therapy               [] Aquatic Therapy              Electronically signed by:  Veronique Matias, BRAIN  3/30/2021, 9:46 AM       3/30/2021,9:46 AM

## 2021-04-02 ENCOUNTER — HOSPITAL ENCOUNTER (OUTPATIENT)
Dept: PHYSICAL THERAPY | Age: 76
Setting detail: THERAPIES SERIES
Discharge: HOME | End: 2021-04-02
Payer: MEDICARE

## 2021-04-02 PROCEDURE — 97110 THERAPEUTIC EXERCISES: CPT

## 2021-04-02 PROCEDURE — 97140 MANUAL THERAPY 1/> REGIONS: CPT

## 2021-04-02 NOTE — FLOWSHEET NOTE
Outpatient Physical Therapy  [place]           [x] Phone: [phone]   Fax: [phone]  [place]           [] Phone: [phone]   Fax: [phone]        Physical Therapy Daily Treatment Note  Date:  2021    Patient Name:  Johnnie Kauffman    :  1945  MRN: [id number]  Restrictions/Precautions: Other position/activity restrictions: none  Diagnosis:   Diagnosis: R shoulder/deltoid pain  Date of Injury/Surgery:   Treatment Diagnosis: Treatment Diagnosis: R shoulder pain, stiffness, weakness, inflammation    Insurance/Certification information: PT Insurance Information: [id number]   Referring Physician:  Referring Practitioner: Sveta Hunt Doctor Visit:    Plan of care signed (Y/N): YES  Outcome Measure: DASH 66%  Visit# / total visits:    POC  Pain level: 4-5/10 With arm movement  Goals:       Short term goals  Time Frame for Short term goals: 3 weeks  Short term goal 1: Pt will be able to report at least 25% reduction in pain  Short term goal 2: Pt will be able to advance scap adn RTC work w/o increased pain  Short term goal 3: Pt will be able to report improved sleep  Long term goals  Time Frame for Long term goals : 6 weeks  Long term goal 1: Pt will be independent w/ HEP and able to continue to manage her pain on her own after PT  Long term goal 2: Pt will be able to sleep w/o shoulder pain  Long term goal 3: Pt will have improved DASH score by 20% or more    Summary of Evaluation: Assessment: Pt is a 79 yo female who presents w/ R shoulder pain. She has no Hx shoulder pain/injury. She demonstrates painful and limited shoulder ROM w/ decreased strength and TTP w/ + impingement signs. These limiations are affecting her sleep and abiltiy to perform usual ADL's. She will benefit from PT to help reduce pain and inflammmation and restore PLOF. Prior to  she had no R shoulder pain.         Subjective:  Pt states 4-5/10 pain today with and without movement, can find a comfortable position without pain but it becomes painful after a while. Any changes in Ambulatory Summary Sheet? None        Objective:    Prior to today's treatment session, patient was screened for signs and symptoms related to COVID-19 including but not limited to verbally answering questions related to feeling ill, cough, or SOB, along with taking temperature via forehead thermometer. Patient presented with all negative signs and symptoms and had no fever >100 degrees Fahrenheit this date. Pt notes decrease in pain after gentle mobs. Exercises: (No more than 4 columns)   Exercise/Equipment 3/23/21  #1 3/26/2021 #2 Date 3/30/21 #3 4/2/21 #4            WARM UP                        TABLE       Supine press up Cane 10x  Cane x 10  (man)   Cane x 10 Cane 2x10 Cane 2x10   Supine flex  Cane 10x  Cane x10  (man)   Cane x 10 Cane 2x10 Cane 2x10   Supine ER @ 90 Cane 10x AAROM in tolerated range x 15 ea dir  S/L w/ Man scap assist  X 10 ER Cane 2x10 Cane 2x10   Shoulder isometrics   Man 10x5\"   Flex/ ext/ abd/ IR/ ER   also did 1 of ea ( flex/ abd/ Ext) against ball on wall for demo for HEP   Man 10x5\"   Flex/ ext/ abd/ IR/ ER 10x5\"   Flex/ ext/ abd/ IR/ ER into ball on wall     S/L ER    AA for scap mobility/ 10x    Supine SA punch   AA/ 10x                         STANDING       Door way stretch pec/bicep  10\" x2 ea, light      scap squeezes  5x, easy  10x2  After man 10x - improved mobility 10x   Ball on wall    10x ea dir 10x ea dir   Core wheel flex   2x5 with LUE assist as needed                              PROPRIOCEPTION                                          MODALITIES       Vaso  10' -- States she will ice at home. States she will ice at home. Other Therapeutic Activities/Education:  3/23/2021: Patient received education on their current pathology and how their condition effects them with their functional activities. Patient understood discussion and questions were answered.  Patient understands their activity limitations and understands rational for treatment progression. Home Exercise Program:  Issued, practiced and pt demo ability to perform 3/23/2021   Added isometrics ( flexion, extension, and abduction) sub max against wall ( 3/26/2021)      Manual Treatments:  [address] mobs inf and post, light PROM R shoulder, 15' total      Modalities:      Communication with other providers:  POC faxed 3/23/21      Assessment:  Pt demonstrated overall good tolerance with today's session. Reports no discomfort with PROM end ranges this session. Fatigues easily with wall exercises. She will benefit from PT to help reduce pain and inflammmation and restore PLOF. Reports some reduction of pain after session.       Plan for Next Session: continue manual work to R shoulder for joint mobs and ROM for pain control and gentle motion, advance ROM and stretching to ruma, scap stability as able also, continue vaso/ice prn w/ educ for home      Time In / Time Out:    8432/5940      Timed Code/Total Treatment Minutes: 40/40, 25' (2) TE, 15' (1) MT      Next Progress Note due:  Visit 10      Plan of Care Interventions:  [x] Therapeutic Exercise  [x] Modalities:  [x] Therapeutic Activity     [] Ultrasound  [] Estim  [] Gait Training      [] Cervical Traction [] Lumbar Traction  [x] Neuromuscular Re-education    [x] Cold/hotpack [] Iontophoresis   [x] Instruction in HEP      [x] Vasopneumatic   [] Dry Needling    [x] Manual Therapy               [] Aquatic Therapy              Electronically signed by:  Moises Leonard PTA  4/2/2021, 8:09 AM       4/2/2021,8:09 AM

## 2021-04-06 ENCOUNTER — HOSPITAL ENCOUNTER (OUTPATIENT)
Dept: PHYSICAL THERAPY | Age: 76
Setting detail: THERAPIES SERIES
Discharge: HOME | End: 2021-04-06
Payer: MEDICARE

## 2021-04-06 PROCEDURE — 97140 MANUAL THERAPY 1/> REGIONS: CPT

## 2021-04-06 PROCEDURE — 97016 VASOPNEUMATIC DEVICE THERAPY: CPT

## 2021-04-06 PROCEDURE — 97110 THERAPEUTIC EXERCISES: CPT

## 2021-04-06 NOTE — FLOWSHEET NOTE
Outpatient Physical Therapy  [place]           [x] Phone: [phone]   Fax: [phone]  [facility]           [] Phone: [phone]   Fax: [phone]        Physical Therapy Daily Treatment Note  Date:  2021    Patient Name:  Radha Clark    :  1945  MRN: [id number]  Restrictions/Precautions: Other position/activity restrictions: none  Diagnosis:   Diagnosis: R shoulder/deltoid pain  Date of Injury/Surgery:   Treatment Diagnosis: Treatment Diagnosis: R shoulder pain, stiffness, weakness, inflammation    Insurance/Certification information: PT Insurance Information: [id number]   Referring Physician:  Referring Practitioner: Olya Veliz  Next Doctor Visit:    Plan of care signed (Y/N): YES  Outcome Measure: DASH 66%  Visit# / total visits:    POC  Pain level: 2/10 currently this morning    Goals:       Short term goals  Time Frame for Short term goals: 3 weeks  Short term goal 1: Pt will be able to report at least 25% reduction in pain  Short term goal 2: Pt will be able to advance scap adn RTC work w/o increased pain  Short term goal 3: Pt will be able to report improved sleep  Long term goals  Time Frame for Long term goals : 6 weeks  Long term goal 1: Pt will be independent w/ HEP and able to continue to manage her pain on her own after PT  Long term goal 2: Pt will be able to sleep w/o shoulder pain  Long term goal 3: Pt will have improved DASH score by 20% or more    Summary of Evaluation: Assessment: Pt is a 77 yo female who presents w/ R shoulder pain. She has no Hx shoulder pain/injury. She demonstrates painful and limited shoulder ROM w/ decreased strength and TTP w/ + impingement signs. These limiations are affecting her sleep and abiltiy to perform usual ADL's. She will benefit from PT to help reduce pain and inflammmation and restore PLOF. Prior to  she had no R shoulder pain.         Subjective:  Pt continues to report of increase pain with reaching overhead, out to her MODALITIES       Vaso  -- States she will ice at home. States she will ice at home. Vaso x 10 min              Other Therapeutic Activities/Education:  3/23/2021: Patient received education on their current pathology and how their condition effects them with their functional activities. Patient understood discussion and questions were answered. Patient understands their activity limitations and understands rational for treatment progression. Home Exercise Program:  Issued, practiced and pt demo ability to perform 3/23/2021   Added isometrics ( flexion, extension, and abduction) sub max against wall ( 3/26/2021)      Manual Treatments:  Gentle GH mobs inf and post,  PROM R shoulder flex, abd, ER, IR      Modalities:  Patient received vasocompression on right shld for pain and inflammation for 10 min on low pressure. Patient had negative skin reaction afterwards. Communication with other providers:  POC faxed 3/23/21      Assessment:  Pt demonstrated overall good tolerance with today's session. Having some discomfort at endranges with PROM. Fatigues easily with wall exercises and reports of discomfort post shld/proximal tricep area while performing. She will benefit from PT to help reduce pain and inflammmation and restore PLOF.     End pain: 0/10 following vaso    Plan for Next Session: continue manual work to R shoulder for joint mobs and ROM for pain control and gentle motion, advance ROM and stretching to ruma, scap stability as able also, continue vaso/ice prn w/ educ for home      Time In / Time Out:    0904/0949      Timed Code/Total Treatment Minutes: 35/45, (1) TE, (1) MT, (1) vaso      Next Progress Note due:  Visit 10      Plan of Care Interventions:  [x] Therapeutic Exercise  [x] Modalities:  [x] Therapeutic Activity     [] Ultrasound  [] Estim  [] Gait Training      [] Cervical Traction [] Lumbar Traction  [x] Neuromuscular Re-education    [x] Cold/hotpack [] Iontophoresis   [x] Instruction in HEP      [x] Vasopneumatic   [] Dry Needling    [x] Manual Therapy               [] Aquatic Therapy              Electronically signed by:  Marlene Allen PTA  4/6/2021, 9:04 AM       4/6/2021,9:04 AM

## 2021-04-08 ENCOUNTER — HOSPITAL ENCOUNTER (OUTPATIENT)
Dept: PHYSICAL THERAPY | Age: 76
Setting detail: THERAPIES SERIES
Discharge: HOME | End: 2021-04-08
Payer: MEDICARE

## 2021-04-08 PROCEDURE — 97016 VASOPNEUMATIC DEVICE THERAPY: CPT

## 2021-04-08 PROCEDURE — 97140 MANUAL THERAPY 1/> REGIONS: CPT

## 2021-04-08 PROCEDURE — 97110 THERAPEUTIC EXERCISES: CPT

## 2021-04-08 NOTE — FLOWSHEET NOTE
Outpatient Physical Therapy  [place]           [x] Phone: [phone]   Fax: [phone]  [facility]           [] Phone: [phone]   Fax: [phone]        Physical Therapy Daily Treatment Note  Date:  2021    Patient Name:  Becki Resendez    :  1945  MRN: [id number]  Restrictions/Precautions: Other position/activity restrictions: none  Diagnosis:   Diagnosis: R shoulder/deltoid pain  Date of Injury/Surgery:   Treatment Diagnosis: Treatment Diagnosis: R shoulder pain, stiffness, weakness, inflammation    Insurance/Certification information: PT Insurance Information: [id number]   Referring Physician:  Referring Practitioner: Katerina Hunt Doctor Visit:    Plan of care signed (Y/N): YES  Outcome Measure: DASH 66%  Visit# / total visits:    POC  Pain level: 1-210 currently this morning    Goals:       Short term goals  Time Frame for Short term goals: 3 weeks  Short term goal 1: Pt will be able to report at least 25% reduction in pain  Short term goal 2: Pt will be able to advance scap adn RTC work w/o increased pain  Short term goal 3: Pt will be able to report improved sleep  Long term goals  Time Frame for Long term goals : 6 weeks  Long term goal 1: Pt will be independent w/ HEP and able to continue to manage her pain on her own after PT  Long term goal 2: Pt will be able to sleep w/o shoulder pain  Long term goal 3: Pt will have improved DASH score by 20% or more    Summary of Evaluation: Assessment: Pt is a 79 yo female who presents w/ R shoulder pain. She has no Hx shoulder pain/injury. She demonstrates painful and limited shoulder ROM w/ decreased strength and TTP w/ + impingement signs. These limiations are affecting her sleep and abiltiy to perform usual ADL's. She will benefit from PT to help reduce pain and inflammmation and restore PLOF. Prior to  she had no R shoulder pain. Subjective:  Pt states she is doing a little better.  Still having pain with overhead movements but not as intense. Slept better last night. Any changes in Ambulatory Summary Sheet? None        Objective:    Prior to today's treatment session, patient was screened for signs and symptoms related to COVID-19 including but not limited to verbally answering questions related to feeling ill, cough, or SOB, along with taking temperature via forehead thermometer. Patient presented with all negative signs and symptoms and had no fever >100 degrees Fahrenheit this date. Pt notes decrease in pain after gentle mobs. Having some discomfort at endranges with PROM flex and abd    PLEASE WORK ON MECHANICS OF ELEVATION AND AVOIDING IMPINGEMENT/UT COMPENSATION, ANTHONY W/ ADLS AND REST AS LISTED IN PLAN. SCHEDULE MORE RX AND TRY TO GET A COUPLE W/ ME PLEASE.    [address] YOU---SPT 4/8    Exercises: (No more than 4 columns)   Exercise/Equipment Date 3/30/21 #3 4/2/21 #4 4/6/21 #5 4/8/21 #6            WARM UP          pulleys    10x flex          TABLE       Supine press up Cane 2x10 Cane 2x10 Cane 2x10 Cane 2x10   Supine flex  Cane 2x10 Cane 2x10 Cane 2x10 Cane 2x10   Supine ER @ 90 Cane 2x10 Cane 2x10 Cane 2x10 Cane 2x10   Shoulder isometrics  Man 10x5\"   Flex/ ext/ abd/ IR/ ER 10x5\"   Flex/ ext/ abd/ IR/ ER into ball on wall 10x5\"   Flex/ ext/ abd/ IR/ ER HEP     S/L ER  AA for scap mobility/ 10x  10x2, towel roll 10x2, towel roll   Supine SA punch AA/ 10x  AA/ 10x2 A/AA 10x2   Supine flex    ARC's 10x                 STANDING       Door way stretch pec/bicep    Hand low 10ct x 3 reps Hand low 20ct x 3 reps   scap squeezes  10x - improved mobility 10x 10x OTB 10x verbal and tactile cueing   Ball on wall  10x ea dir 10x ea dir 10x ea dir 10x ea dir   Core wheel flex 2x5 with LUE assist as needed  Wall slides, using wash cloth, flex 10x limited range Wall slides, using wash cloth, flex, circles cw/ccw 10x ea                             PROPRIOCEPTION                                          MODALITIES       Vaso States she will ice at home. States she will ice at home. Vaso x 10 min Vaso x10 min              Other Therapeutic Activities/Education:  3/23/2021: Patient received education on their current pathology and how their condition effects them with their functional activities. Patient understood discussion and questions were answered. Patient understands their activity limitations and understands rational for treatment progression. Home Exercise Program:  Issued, practiced and pt demo ability to perform 3/23/2021   Added isometrics ( flexion, extension, and abduction) sub max against wall ( 3/26/2021)      Manual Treatments:  Gentle GH mobs inf and post,  PROM R shoulder flex, abd, ER, IR, scap mobility. Modalities:  Patient received vasocompression on right shld for pain and inflammation for 10 min on low pressure. Patient had negative skin reaction afterwards. Communication with other providers:  POC faxed 3/23/21      Assessment:  Pt demonstrated overall good tolerance with today's session. States she usually feels better after her therapy sessions. PROM is [facility] but still having some discomfort flex and abd endranges. Fatigues easily with wall exercises and reports of discomfort post shld/proximal tricep area while performing. She will benefit from PT to help reduce pain and inflammmation and restore PLOF.     End pain: 0/10 following vaso    Plan for Next Session: continue manual work to R shoulder for joint mobs and ROM for pain control and gentle motion, advance ROM and stretching to ruma, scap stability as able also, continue vaso/ice prn w/ educ for home      Time In / Time Out:    0902/0952      Timed Code/Total Treatment Minutes: 40/50, (2) TE, (1) MT, (1) vaso      Next Progress Note due:  Visit 10      Plan of Care Interventions:  [x] Therapeutic Exercise  [x] Modalities:  [x] Therapeutic Activity     [] Ultrasound  [] Estim  [] Gait Training      [] Cervical Traction [] Lumbar Traction  [x] Neuromuscular Re-education    [x] Cold/hotpack [] Iontophoresis   [x] Instruction in HEP      [x] Vasopneumatic   [] Dry Needling    [x] Manual Therapy               [] Aquatic Therapy              Electronically signed by:  Cheryl Pulliam PTA  4/8/2021, 9:02 AM       4/8/2021,9:02 AM

## 2021-04-15 ENCOUNTER — HOSPITAL ENCOUNTER (OUTPATIENT)
Dept: PHYSICAL THERAPY | Age: 76
Setting detail: THERAPIES SERIES
Discharge: HOME | End: 2021-04-15
Payer: MEDICARE

## 2021-04-15 PROCEDURE — 97110 THERAPEUTIC EXERCISES: CPT

## 2021-04-15 PROCEDURE — 97140 MANUAL THERAPY 1/> REGIONS: CPT

## 2021-04-15 PROCEDURE — 97016 VASOPNEUMATIC DEVICE THERAPY: CPT

## 2021-04-15 NOTE — FLOWSHEET NOTE
Outpatient Physical Therapy  [place]           [x] Phone: [phone]   Fax: [phone]  [place]           [] Phone: [phone]   Fax: [phone]        Physical Therapy Daily Treatment Note  Date:  4/15/2021    Patient Name:  Bertin Benoit    :  1945  MRN: [id number]  Restrictions/Precautions: Other position/activity restrictions: none  Diagnosis:   Diagnosis: R shoulder/deltoid pain  Date of Injury/Surgery:   Treatment Diagnosis: Treatment Diagnosis: R shoulder pain, stiffness, weakness, inflammation    Insurance/Certification information: PT Insurance Information: [id number]   Referring Physician:  Referring Practitioner: Greg Bosworth  Next Doctor Visit:    Plan of care signed (Y/N): YES  Outcome Measure: DASH 66%  Visit# / total visits:    POC  Pain level: 2/10 currently this morning    Goals:       Short term goals  Time Frame for Short term goals: 3 weeks  Short term goal 1: Pt will be able to report at least 25% reduction in pain  Short term goal 2: Pt will be able to advance scap adn RTC work w/o increased pain  Short term goal 3: Pt will be able to report improved sleep  Long term goals  Time Frame for Long term goals : 6 weeks  Long term goal 1: Pt will be independent w/ HEP and able to continue to manage her pain on her own after PT  Long term goal 2: Pt will be able to sleep w/o shoulder pain  Long term goal 3: Pt will have improved DASH score by 20% or more    Summary of Evaluation: Assessment: Pt is a 79 yo female who presents w/ R shoulder pain. She has no Hx shoulder pain/injury. She demonstrates painful and limited shoulder ROM w/ decreased strength and TTP w/ + impingement signs. These limiations are affecting her sleep and abiltiy to perform usual ADL's. She will benefit from PT to help reduce pain and inflammmation and restore PLOF. Prior to  she had no R shoulder pain. Subjective:  Pt continuing to note improvement.  Reports of less pain overall but certain movements are still causing pain. Has been sleeping better. Any changes in Ambulatory Summary Sheet? None        Objective:    Prior to today's treatment session, patient was screened for signs and symptoms related to COVID-19 including but not limited to verbally answering questions related to feeling ill, cough, or SOB, along with taking temperature via forehead thermometer. Patient presented with all negative signs and symptoms and had no fever >100 degrees Fahrenheit this date. Pt notes decrease in pain after gentle mobs. Having some discomfort at endranges with PROM flex and IR, No pain with endrange abd today. PLEASE WORK ON MECHANICS OF ELEVATION AND AVOIDING IMPINGEMENT/UT COMPENSATION, ANTHONY W/ ADLS AND REST AS LISTED IN PLAN. SCHEDULE MORE RX AND TRY TO GET A COUPLE W/ ME PLEASE.    [address] YOU---SPT 4/8    Exercises: (No more than 4 columns)   Exercise/Equipment 4/2/21 #4 4/6/21 #5 4/8/21 #6 4/15/21 #7            WARM UP          pulleys   10x flex 10x flex and scap          TABLE       Supine press up Cane 2x10 Cane 2x10 Cane 2x10 Cane 1x10   Supine flex  Cane 2x10 Cane 2x10 Cane 2x10 Cane 1x10   Supine ER @ 90 Cane 2x10 Cane 2x10 Cane 2x10 Cane 1x10   Shoulder isometrics  10x5\"   Flex/ ext/ abd/ IR/ ER into ball on wall 10x5\"   Flex/ ext/ abd/ IR/ ER HEP      S/L ER   10x2, towel roll 10x2, towel roll 10x2, towel roll   Supine SA punch  AA/ 10x2 A/AA 10x2 Active 2x10   Supine flex   ARC's 10x ARC's 10x2                 STANDING       Door way stretch pec/bicep   Hand low 10ct x 3 reps Hand low 20ct x 3 reps Hand low 20ct x 3 reps   scap squeezes  10x 10x OTB 10x verbal and tactile cueing OTB 10x verbal and tactile cueing to avoid shld shrug   Ball on wall  10x ea dir 10x ea dir 10x ea dir 10x ea dir, at or just below shld height    Core wheel flex  Wall slides, using wash cloth, flex 10x limited range Wall slides, using wash cloth, flex, circles cw/ccw 10x ea ---   Cane ext    10x Standing shld flex and scap to 90 deg    Back against wall 10x ea               PROPRIOCEPTION                                          MODALITIES       Vaso  States she will ice at home. Vaso x 10 min Vaso x10 min Vaso x10 min              Other Therapeutic Activities/Education:  3/23/2021: Patient received education on their current pathology and how their condition effects them with their functional activities. Patient understood discussion and questions were answered. Patient understands their activity limitations and understands rational for treatment progression. Home Exercise Program:  Issued, practiced and pt demo ability to perform 3/23/2021   Added isometrics ( flexion, extension, and abduction) sub max against wall ( 3/26/2021)  Added standing, back against wall,  flex and scaption to 90 deg and shld ext using cane. Provided with handouts. Manual Treatments:  Gentle GH mobs inf and post,  PROM R shoulder flex, abd, ER, IR, scap mobility. Modalities:  Patient received vasocompression on right shld for pain and inflammation for 10 min on low pressure. Patient had negative skin reaction afterwards. Communication with other providers:  POC faxed 3/23/21      Assessment:  Pt demonstrated overall good tolerance with today's session. Reports of no pain following session. Min cues given today for UT compensation. Scapular mobility slowly improving. Continues to fatigue easily with wall exercises and reports of discomfort post shld/proximal tricep area while performing. She will benefit from PT to help reduce pain and inflammmation and restore PLOF.     End pain: 0/10 following vaso    Plan for Next Session: continue manual work to R shoulder for joint mobs and ROM for pain control and gentle motion, advance ROM and stretching to ruma, scap stability as able also, continue vaso/ice prn w/ educ for home      Time In / Time Out:    0903/0953      Timed Code/Total Treatment Minutes: 40/50, (2) TE, (1) MT, (1) vaso      Next Progress Note due:  Visit 10      Plan of Care Interventions:  [x] Therapeutic Exercise  [x] Modalities:  [x] Therapeutic Activity     [] Ultrasound  [] Estim  [] Gait Training      [] Cervical Traction [] Lumbar Traction  [x] Neuromuscular Re-education    [x] Cold/hotpack [] Iontophoresis   [x] Instruction in HEP      [x] Vasopneumatic   [] Dry Needling    [x] Manual Therapy               [] Aquatic Therapy              Electronically signed by:  Kayden Forde PTA  4/15/2021, 9:03 AM       4/15/2021,9:03 AM

## 2021-04-19 ENCOUNTER — HOSPITAL ENCOUNTER (OUTPATIENT)
Dept: PHYSICAL THERAPY | Age: 76
Setting detail: THERAPIES SERIES
Discharge: HOME | End: 2021-04-19
Payer: MEDICARE

## 2021-04-19 PROCEDURE — 97016 VASOPNEUMATIC DEVICE THERAPY: CPT

## 2021-04-19 PROCEDURE — 97112 NEUROMUSCULAR REEDUCATION: CPT

## 2021-04-19 PROCEDURE — 97110 THERAPEUTIC EXERCISES: CPT

## 2021-04-19 PROCEDURE — 97140 MANUAL THERAPY 1/> REGIONS: CPT

## 2021-04-19 NOTE — FLOWSHEET NOTE
Patients Plan of Care was received and signed. Signed POC was scanned and placed in the patients chart.     Korey De Los Santos

## 2021-04-19 NOTE — FLOWSHEET NOTE
Outpatient Physical Therapy  [place]           [x] Phone: [phone]   Fax: [phone]  [facility]           [] Phone: [phone]   Fax: [phone]        Physical Therapy Daily Treatment Note  Date:  2021    Patient Name:  Es Iglesias    :  1945  MRN: [id number]  Restrictions/Precautions: Other position/activity restrictions: none  Diagnosis:   Diagnosis: R shoulder/deltoid pain  Date of Injury/Surgery:   Treatment Diagnosis: Treatment Diagnosis: R shoulder pain, stiffness, weakness, inflammation    Insurance/Certification information: PT Insurance Information: [id number]   Referring Physician:  Referring Practitioner: Carson Bruner  Next Doctor Visit:  Maybe  or July   Plan of care signed (Y/N): YES  Outcome Measure: DASH 66%  Visit# / total visits:    POC  Pain level: 0/10 currently this morning at rest     Goals:       Short term goals  Time Frame for Short term goals: 3 weeks  Short term goal 1: Pt will be able to report at least 25% reduction in pain  Met  Short term goal 2: Pt will be able to advance scap adn RTC work w/o increased pain  Met  Short term goal 3: Pt will be able to report improved sleep  Does ok unless lays on it, partially met   Long term goals  Time Frame for Long term goals : 6 weeks  Long term goal 1: Pt will be independent w/ HEP and able to continue to manage her pain on her own after PT  Good progress  Long term goal 2: Pt will be able to sleep w/o shoulder pain  Good progress  Long term goal 3: Pt will have improved DASH score by 20% or more  Met, continue  DASH 66% at [facility], 21: 33%    Summary of Evaluation: Assessment: Pt is a 79 yo female who presents w/ R shoulder pain. She has no Hx shoulder pain/injury. She demonstrates painful and limited shoulder ROM w/ decreased strength and TTP w/ + impingement signs. These limiations are affecting her sleep and abiltiy to perform usual ADL's.   She will benefit from PT to help reduce pain and inflammmation and restore PLOF. Prior to 2021 she had no R shoulder pain. Subjective:    Pain is not constant, and continues to improve, less pain w/ driving now but still some. Pain 5/10, sharp and short lasting w/ movements. Max pain is about 5-6/10. Using ice mostly, min NSAID's. Any changes in Ambulatory Summary Sheet? None        Objective:  COVID screening questions were asked and patient attested that there had been no contact or symptoms    Some pain in tricep w/ pulleys into scap plane and also with reaching out. Pt notes decrease in pain with inferior mobs. AROM seated flex 130 but can go to 150 w/ some increased pain  abd 110 w/ some pain at 60 and feels week  IR and ER min change and still some pain. Full PROM w/ painful arc  MMT:  Improving flex and abd 4+/5 still w/ some pain, IR and ER 4+/5 w/ some pain, pain posterior shoulder   Pt has very tender and noted tissue restriction in teres and subscap which responds fairly well to manual work.            Exercises: (No more than 4 columns)   Exercise/Equipment 4/8/21 #6 4/15/21 #7 4/19/21  #8             WARM UP   UBE retro 2', 120 rpm       pulleys 10x flex 10x flex and scap 10x flex and scap           TABLE       Supine press up Cane 2x10 Cane 1x10 No cane but 1# 10x, 0# 10x    Supine flex  Cane 2x10 Cane 1x10     Supine ER @ 90 Cane 2x10 Cane 1x10 --    Shoulder isometrics  HEP        S/L ER  10x2, towel roll 10x2, towel roll 10x2, towel roll    MR scap retract in S/L   -- 20x    Supine SA punch A/AA 10x2 Active 2x10 10x, 5x    Supine flex ARC's 10x ARC's 10x2 ABC 1x    Self inferior mobs  - Instruct     Prone row  ext  - 10x  10x                         STANDING       Door way stretch pec/bicep  Hand low 20ct x 3 reps Hand low 20ct x 3 reps -    Posterior shoulder stretch in door frame  -- 30\"    scap squeezes  OTB 10x verbal and tactile cueing OTB 10x verbal and tactile cueing to avoid shld shrug Taffy pull RTB 10x2 [place] on wall  10x ea dir 10x ea dir, at or just below shld height  -    Core wheel flex Wall slides, using wash cloth, flex, circles cw/ccw 10x ea --- -    Cane ext  10x -    Standing shld flex and scap to 90 deg  Back against wall 10x ea -                PROPRIOCEPTION                                          MODALITIES       Vaso  Vaso x10 min Vaso x10 min 10'               Other Therapeutic Activities/Education:  3/23/2021: Patient received education on their current pathology and how their condition effects them with their functional activities. Patient understood discussion and questions were answered. Patient understands their activity limitations and understands rational for treatment progression. Home Exercise Program:  Issued, practiced and pt demo ability to perform 3/23/2021   Added isometrics ( flexion, extension, and abduction) sub max against wall ( 3/26/2021)  Added standing, back against wall,  flex and scaption to 90 deg and shld ext using cane. Provided with handouts. 4/19/21: updated HEP     Manual Treatments:  S/L  scap mobs and STM/MFR to teres/subscap, Gentle GH mobs inf and post,  PROM R shoulder flex, abd, ER, IR, scap mobility. Modalities:  Patient received vasocompression on right shld for pain and inflammation for 10 min on low pressure. Patient had negative skin reaction afterwards. Communication with other providers:  POC faxed 3/23/21, See PN 4/19/21      Assessment:  Pt demonstrated overall good tolerance with today's session. Reports of no pain at rest following session, but is sore where we did manual work on posterior shoulder. Pt is making progress w/ some ROM increase and strength gains but she continues w/ painful movements, altered movement patterns and weakness w/ limited AROM. She will benefit from PT to help reduce pain and inflammmation, restore movement pattern and strength in order to restore PLOF.     End pain: 0/10 following vaso    Plan for Next Session: continue manual work to R shoulder for scap mobs,  joint mobs and ROM as well as soft tissue work to posterior shoulder as needed, advance ROM, stretching and strength to ruma, continue vaso/ice prn w/ educ for home      Time In / Time Out:   1015/1120      Timed Code/Total Treatment Minutes:   50/60      (1) NR, (1) TE, (1) MT, (1) vaso      Next Progress Note due:  See PN 4/19/21      Plan of Care Interventions:  [x] Therapeutic Exercise  [x] Modalities:  [x] Therapeutic Activity     [] Ultrasound  [] Estim  [] Gait Training      [] Cervical Traction [] Lumbar Traction  [x] Neuromuscular Re-education    [x] Cold/hotpack [] Iontophoresis   [x] Instruction in HEP      [x] Vasopneumatic   [] Dry Needling    [x] Manual Therapy               [] Aquatic Therapy              Electronically signed by:  Chucky Rogers, PT, MPT, ATC   4/19/2021, 8:53 AM    4/19/2021, 12:18 PM

## 2021-04-19 NOTE — PROGRESS NOTES
Outpatient Physical Therapy           [place]           [] Phone: [phone]   Fax: [phone]  [place]           [] Phone: [phone]   Fax: [phone]      To: Tyler        From: Dalia Pope, PT     Patient: Jerel Chavez                  : 1945  Diagnosis:  R shoulder/deltoid pain    Date: 2021  Treatment Diagnosis: R shoulder pain, stiffness, weakness, inflammation      [x]  Progress Note                []  Discharge Note    Evaluation Date:  3/23/2021  Total Visits to date:   8 Cancels/No-shows to date:      Subjective:  Pain is not constant, and continues to improve, less pain w/ driving now but still some. Pain 5/10, sharp and short lasting w/ movements. Max pain is about 5-6/10. Using ice mostly, min NSAID's. Plan of Care/Treatment to date:  [x] Therapeutic Exercise    [x] Modalities:  [x] Therapeutic Activity     [] Ultrasound  [] Electrical Stimulation  [] Gait Training      [] Cervical Traction   [] Lumbar Traction  [x] Neuromuscular Re-education  [x] Cold/hotpack [] Iontophoresis  [x] Instruction in HEP      Other:  [x] Manual Therapy       [x]  Vasopneumatic  [] Aquatic Therapy       []   Dry Needle Therapy                      Objective/Significant Findings At Last Visit/Comments:  COVID screening questions were asked and patient attested that there had been no contact or symptoms     Some pain in tricep w/ pulleys into scap plane and also with reaching out. Pt notes decrease in pain with inferior mobs. AROM seated flex 130 but can go to 150 w/ some increased pain  abd 110 w/ some pain at 60 and feels week  IR and ER min change and still some pain. Full PROM w/ painful arc  MMT:  Improving flex and abd 4+/5 still w/ some pain, IR and ER 4+/5 w/ some pain, pain posterior shoulder   Pt has very tender and noted tissue restriction in teres and subscap which responds fairly well to manual work.     Assessment:   Pt demonstrated overall good tolerance AM    4/19/2021, 12:23 PM  If you have any questions or concerns, please don't hesitate to call.   Thank you for your referral.    Physician Signature:______________________ Date:______ Time: ________  By signing above, therapists plan is approved by physician

## 2021-04-23 ENCOUNTER — HOSPITAL ENCOUNTER (OUTPATIENT)
Dept: PHYSICAL THERAPY | Age: 76
Setting detail: THERAPIES SERIES
Discharge: HOME | End: 2021-04-23
Payer: MEDICARE

## 2021-04-23 PROCEDURE — 97016 VASOPNEUMATIC DEVICE THERAPY: CPT

## 2021-04-23 PROCEDURE — 97112 NEUROMUSCULAR REEDUCATION: CPT

## 2021-04-23 PROCEDURE — 97140 MANUAL THERAPY 1/> REGIONS: CPT

## 2021-04-23 PROCEDURE — 97110 THERAPEUTIC EXERCISES: CPT

## 2021-04-23 NOTE — FLOWSHEET NOTE
inflammmation and restore PLOF. Prior to 2021 she had no R shoulder pain. Subjective:  Shoulder hurting more yesterday and carried over into today, yesterday up to 4/10. Any changes in Ambulatory Summary Sheet? None        Objective:  COVID screening questions were asked and patient attested that there had been no contact or symptoms    Pt demo tightness in teres and subscap again today, very TTP. Difficult to release but does soften w/ manual work. Pt needs pretty regular cues to avoid hiking shoulder even w/ supine exercises. Pt demo weakness in ER and scapular movements/muscles. Prone row to plane of body ok, above increased pain. Exercises: (No more than 4 columns)   Exercise/Equipment 4/15/21 #7 4/19/21  #8 4/23/21  #9             WARM UP  UBE retro 2', 120 rpm UBE retro 2', 120 rpm       pulleys 10x flex and scap 10x flex and scap 10x flex and scap           TABLE       Supine press up Cane 1x10 No cane but 1# 10x, 0# 10x No cane but 1# 10x, 0# 10x    Supine flex  Cane 1x10  No cane,AROM 10x2    Horiz Abd   -- Supine 10x2    Supine ER @ 90 Cane 1x10 -- -      S/L ER  10x2, towel roll 10x2, towel roll 10x2, towel roll    MR scap retract in S/L  -- 20x 20x    Supine SA punch Active 2x10 10x, 5x 10x2 AAROM  1#?    Supine flex ARC's 10x2 ABC 1x ABC 1x    Self inferior mobs - Instruct  --    Prone row  ext - 10x  10x 10x, just to body  10x2                         STANDING       Door way stretch pec/bicep  Hand low 20ct x 3 reps - -    Posterior shoulder stretch in door frame -- 30\" Man     scap squeezes  OTB 10x verbal and tactile cueing to avoid shld shrug Taffy pull RTB 10x2 Taffy pull OTB 10x2    Ball on wall  10x ea dir, at or just below shld height  - 10x ea dir, at or just below shld height     Core wheel flex --- - -    Cane ext 10x - -    Standing shld flex and scap to 90 deg Back against wall 10x ea - -                PROPRIOCEPTION MODALITIES       Vaso  Vaso x10 min 10' 10'               Other Therapeutic Activities/Education:  3/23/2021: Patient received education on their current pathology and how their condition effects them with their functional activities. Patient understood discussion and questions were answered. Patient understands their activity limitations and understands rational for treatment progression. Home Exercise Program:  Issued, practiced and pt demo ability to perform 3/23/2021   Added isometrics ( flexion, extension, and abduction) sub max against wall ( 3/26/2021)  Added standing, back against wall,  flex and scaption to 90 deg and shld ext using cane. Provided with handouts. 4/19/21: updated HEP     Manual Treatments:  S/L  scap mobs and STM/MFR to teres/subscap, Gentle GH mobs inf and post,  PROM R shoulder flex, abd, ER, IR, scap mobility. Modalities:  Patient received vasocompression on right shld for pain and inflammation for 10 min on low pressure. Patient had negative skin reaction afterwards. Communication with other providers:  POC faxed 3/23/21, See PN 4/19/21      Assessment:  Pt demonstrated overall good tolerance with today's session. Pt is making progress w/ some ROM increase and strength gains but she continues w/ painful movements, altered movement patterns and weakness w/ limited AROM. She will benefit from PT to help reduce pain and inflammmation, restore movement pattern and strength in order to restore PLOF.     End pain: 2/10 following vaso, though did have some increased pain before ice    Plan for Next Session: continue manual work to R shoulder for posterior tightness and continue scap mobs,  joint mobs and ROM, advance ROM, stretching and strength to ruma, continue vaso/ice prn w/ educ for home      Time In / Time Out:    0700/0800      Timed Code/Total Treatment Minutes:  50/60       (1) NR, (1) TE, (1) MT, (1) vaso      Next Progress Note due:  See PN 4/19/21      Plan of Care Interventions:  [x] Therapeutic Exercise  [x] Modalities:  [x] Therapeutic Activity     [] Ultrasound  [] Estim  [] Gait Training      [] Cervical Traction [] Lumbar Traction  [x] Neuromuscular Re-education    [x] Cold/hotpack [] Iontophoresis   [x] Instruction in HEP      [x] Vasopneumatic   [] Dry Needling    [x] Manual Therapy               [] Aquatic Therapy              Electronically signed by:  Eli Gill, PT, MPT, ATC   4/23/2021, 6:38 AM    4/23/2021, 7:56 AM

## 2021-04-28 ENCOUNTER — HOSPITAL ENCOUNTER (OUTPATIENT)
Dept: PHYSICAL THERAPY | Age: 76
Setting detail: THERAPIES SERIES
Discharge: HOME | End: 2021-04-28
Payer: MEDICARE

## 2021-04-28 PROCEDURE — 97140 MANUAL THERAPY 1/> REGIONS: CPT

## 2021-04-28 PROCEDURE — 97016 VASOPNEUMATIC DEVICE THERAPY: CPT

## 2021-04-28 PROCEDURE — 97110 THERAPEUTIC EXERCISES: CPT

## 2021-04-28 PROCEDURE — 97112 NEUROMUSCULAR REEDUCATION: CPT

## 2021-04-28 NOTE — FLOWSHEET NOTE
Outpatient Physical Therapy  [place]           [x] Phone: [phone]   Fax: [phone]  [facility]           [] Phone: [phone]   Fax: [phone]        Physical Therapy Daily Treatment Note  Date:  2021    Patient Name:  Deon Oconnor    :  1945  MRN: [id number]  Restrictions/Precautions: Other position/activity restrictions: none  Diagnosis:   Diagnosis: R shoulder/deltoid pain  Date of Injury/Surgery:   Treatment Diagnosis: Treatment Diagnosis: R shoulder pain, stiffness, weakness, inflammation    Insurance/Certification information: PT Insurance Information: [id number]   Referring Physician:  Referring Practitioner: Mecca Velazco  Next Doctor Visit:  Maybe  or July   Plan of care signed (Y/N): YES  Outcome Measure: DASH 66%  Visit# / total visits:   10/12- POC  Pain level: 1-2/10 currently this morning at rest     Goals:       Short term goals  Time Frame for Short term goals: 3 weeks  Short term goal 1: Pt will be able to report at least 25% reduction in pain  Met  Short term goal 2: Pt will be able to advance scap adn RTC work w/o increased pain  Met  Short term goal 3: Pt will be able to report improved sleep  Does ok unless lays on it, partially met   Long term goals  Time Frame for Long term goals : 6 weeks  Long term goal 1: Pt will be independent w/ HEP and able to continue to manage her pain on her own after PT  Good progress  Long term goal 2: Pt will be able to sleep w/o shoulder pain  Good progress  Long term goal 3: Pt will have improved DASH score by 20% or more  Met, continue  DASH 66% at [facility], 21: 33%    Summary of Evaluation: Assessment: Pt is a 77 yo female who presents w/ R shoulder pain. She has no Hx shoulder pain/injury. She demonstrates painful and limited shoulder ROM w/ decreased strength and TTP w/ + impingement signs. These limiations are affecting her sleep and abiltiy to perform usual ADL's.   She will benefit from PT to help reduce pain and inflammmation and restore PLOF. Prior to 2021 she had no R shoulder pain. Subjective:  Pt states her pain is not too bad this morning. States she usually does feel better after therapy sessions. Any changes in Ambulatory Summary Sheet? None        Objective:  COVID screening questions were asked and patient attested that there had been no contact or symptoms    Pt demo tightness in teres and subscap, very TTP. Pt needs pretty regular cues to avoid hiking shoulder. Did demo improved performance with supine exercises. Pt demo weakness in ER and scapular movements/muscles. Prone row to plane of body ok, above continues to increase pain.             Exercises: (No more than 4 columns)   Exercise/Equipment 4/15/21 #7 4/19/21  #8 4/23/21  #9 4/28/21 #10            WARM UP  UBE retro 2', 120 rpm UBE retro 2', 120 rpm UBE retro 2', 120 rpm      pulleys 10x flex and scap 10x flex and scap 10x flex and scap 10x flex and scap          TABLE       Supine press up Cane 1x10 No cane but 1# 10x, 0# 10x No cane but 1# 10x, 0# 10x No cane but 1# 10x, 0# 10x   Supine flex  Cane 1x10  No cane,AROM 10x2 No cane,AROM 10x2   Horiz Abd   -- Supine 10x2 Supine 10x2   Supine ER @ 90 Cane 1x10 -- -      S/L ER  10x2, towel roll 10x2, towel roll 10x2, towel roll 10x2, towel roll   MR scap retract in S/L  -- 20x 20x 20x   Supine SA punch Active 2x10 10x, 5x 10x2 AAROM  1# 2x10   Supine flex ARC's 10x2 ABC 1x ABC 1x ABC 1x   Self inferior mobs - Instruct  --    Prone row  ext - 10x  10x 10x, just to body  10x2 Prone Ext and row 2x10 ea                        STANDING       Door way stretch pec/bicep  Hand low 20ct x 3 reps - -    Posterior shoulder stretch in door frame -- 30\" Man     scap squeezes  OTB 10x verbal and tactile cueing to avoid shld shrug Taffy pull RTB 10x2 Taffy pull OTB 10x2 Taffy pull OTB 10x2   Ball on wall  10x ea dir, at or just below shld height  - 10x ea dir, at or just below shld height  10x ea dir, at or just below shld height   Core wheel flex --- - -    Cane ext 10x - -    Standing shld flex and scap to 90 deg Back against wall 10x ea - -                PROPRIOCEPTION                                          MODALITIES       Vaso  Vaso x10 min 10' 10' 10'              Other Therapeutic Activities/Education:  3/23/2021: Patient received education on their current pathology and how their condition effects them with their functional activities. Patient understood discussion and questions were answered. Patient understands their activity limitations and understands rational for treatment progression. Home Exercise Program:  Issued, practiced and pt demo ability to perform 3/23/2021   Added isometrics ( flexion, extension, and abduction) sub max against wall ( 3/26/2021)  Added standing, back against wall,  flex and scaption to 90 deg and shld ext using cane. Provided with handouts. 4/19/21: updated HEP     Manual Treatments:  S/L  scap mobs and STM/MFR to teres/subscap, Gentle GH mobs inf and post,  PROM R shoulder flex, abd, ER, IR, scap mobility. Modalities:  Patient received vasocompression on right shld for pain and inflammation for 10 min on low pressure. Patient had negative skin reaction afterwards. Communication with other providers:  POC faxed 3/23/21, See PN 4/19/21      Assessment:  Pt demonstrated overall good tolerance with today's session. Remains very TTP along Teres minor and Tricep Brachii Longus Insertion. Pt is making progress w/ some ROM increase and strength gains but she continues w/ painful movements, altered movement patterns and weakness w/ limited AROM. She will benefit from PT to help reduce pain and inflammmation, restore movement pattern and strength in order to restore PLOF.     End pain: 0/10 following vaso    Plan for Next Session: continue manual work to R shoulder for posterior tightness and continue scap mobs,  joint mobs and ROM, advance ROM, stretching and strength to ruma, continue vaso/ice prn w/ educ for home      Time In / Time Out:    0816/0906      Timed Code/Total Treatment Minutes:  40/50       (1) NR, (1) TE, (1) MT, (1) vaso      Next Progress Note due:  See PN 4/19/21      Plan of Care Interventions:  [x] Therapeutic Exercise  [x] Modalities:  [x] Therapeutic Activity     [] Ultrasound  [] Estim  [] Gait Training      [] Cervical Traction [] Lumbar Traction  [x] Neuromuscular Re-education    [x] Cold/hotpack [] Iontophoresis   [x] Instruction in HEP      [x] Vasopneumatic   [] Dry Needling    [x] Manual Therapy               [] Aquatic Therapy              Electronically signed by:  John Hearn PTA,   4/28/2021, 8:16 AM    4/28/2021, 8:16 AM

## 2021-04-30 ENCOUNTER — HOSPITAL ENCOUNTER (OUTPATIENT)
Dept: PHYSICAL THERAPY | Age: 76
Setting detail: THERAPIES SERIES
Discharge: HOME | End: 2021-04-30
Payer: MEDICARE

## 2021-04-30 PROCEDURE — 97016 VASOPNEUMATIC DEVICE THERAPY: CPT

## 2021-04-30 PROCEDURE — 97140 MANUAL THERAPY 1/> REGIONS: CPT

## 2021-04-30 PROCEDURE — 97110 THERAPEUTIC EXERCISES: CPT

## 2021-04-30 PROCEDURE — 97530 THERAPEUTIC ACTIVITIES: CPT

## 2021-04-30 NOTE — FLOWSHEET NOTE
Outpatient Physical Therapy  [place]           [x] Phone: [phone]   Fax: [phone]  Anat Garaydimitrios           [] Phone: [phone]   Fax: [phone]        Physical Therapy Daily Treatment Note  Date:  2021    Patient Name:  Corina Velazquez    :  1945  MRN: [id number]  Restrictions/Precautions: Other position/activity restrictions: none  Diagnosis:   Diagnosis: R shoulder/deltoid pain  Date of Injury/Surgery:   Treatment Diagnosis: Treatment Diagnosis: R shoulder pain, stiffness, weakness, inflammation    Insurance/Certification information: PT Insurance Information: [id number]   Referring Physician:  Referring Practitioner: Everton Mcintosh  Next Doctor Visit:  Maybe  or July   Plan of care signed (Y/N): YES  Outcome Measure: DASH 66%  Visit# / total visits:   - POC  Pain level: 2/10      Goals:       Short term goals  Time Frame for Short term goals: 3 weeks  Short term goal 1: Pt will be able to report at least 25% reduction in pain  Met  Short term goal 2: Pt will be able to advance scap adn RTC work w/o increased pain  Met  Short term goal 3: Pt will be able to report improved sleep  Does ok unless lays on it, partially met   Long term goals  Time Frame for Long term goals : 6 weeks  Long term goal 1: Pt will be independent w/ HEP and able to continue to manage her pain on her own after PT  Good progress  Long term goal 2: Pt will be able to sleep w/o shoulder pain  Good progress  Long term goal 3: Pt will have improved DASH score by 20% or more  Met, continue  DASH 66% at [facility], 21: 33%    Summary of Evaluation: Assessment: Pt is a 79 yo female who presents w/ R shoulder pain. She has no Hx shoulder pain/injury. She demonstrates painful and limited shoulder ROM w/ decreased strength and TTP w/ + impingement signs. These limiations are affecting her sleep and abiltiy to perform usual ADL's. She will benefit from PT to help reduce pain and inflammmation and restore PLOF.   Prior to 2021 she had no R shoulder pain. Subjective:   Pt reports continued slow improvement. Any changes in Ambulatory Summary Sheet? None        Objective:  COVID screening questions were asked and patient attested that there had been no contact or symptoms    Pt still w/ mild pain on UBE and needed to lower it to help prevent shoulder hike. Pt demo tightness in teres but min in subscap this date and less TTP. Pt needs cues to avoid hiking shoulder. Pt able to progress exercises as below this date.                Exercises: (No more than 4 columns)   Exercise/Equipment 4/23/21  #9 4/28/21 #10 4/30/21  #11             WARM UP UBE retro 2', 120 rpm UBE retro 2', 120 rpm UBE retro 2', 120 rpm       pulleys 10x flex and scap 10x flex and scap 10x flex and scap           TABLE       Supine press up No cane but 1# 10x, 0# 10x No cane but 1# 10x, 0# 10x No cane but 1# 10x2    Supine flex  No cane,AROM 10x2 No cane,AROM 10x2 No cane,AROM 10x2    Horiz Abd  Supine 10x2 Supine 10x2 S/L 10x2    Supine ER @ 90 -  -      S/L ER  10x2, towel roll 10x2, towel roll Towel roll, 1# 10x2    MR scap retract in S/L  20x 20x 20x    Supine SA punch 10x2 AAROM  1# 2x10 1# 2x10    Supine ABC   1x   1x 1x     Self inferior mobs --  -    Prone row  ext 10x, just to body  10x2 Prone Ext and row 2x10 ea 10x2  10x2                         STANDING       Door way stretch pec/bicep  -  -    Posterior shoulder stretch in door frame Man   -    scap squeezes  Taffy pull OTB 10x2 Taffy pull OTB 10x2 Taffy pull OTB 10x2 and B ext GTB 10x2     Ball on wall  10x ea dir, at or just below shld height  10x ea dir, at or just below shld height 15x ea dir, at or just below shld height    Standing shld flex and scap to 90 deg -  scap back to wall 10x2    Wall push up    20x                PROPRIOCEPTION                                          MODALITIES       Vaso  10' 10' 10'               Other Therapeutic Activities/Education: 3/23/2021: Patient received education on their current pathology and how their condition effects them with their functional activities. Patient understood discussion and questions were answered. Patient understands their activity limitations and understands rational for treatment progression. Home Exercise Program:  Issued, practiced and pt demo ability to perform 3/23/2021   Added isometrics ( flexion, extension, and abduction) sub max against wall ( 3/26/2021)  Added standing, back against wall,  flex and scaption to 90 deg and shld ext using cane. Provided with handouts. 4/19/21: updated HEP     Manual Treatments:  S/L  scap mobs and STM/MFR to teres/subscap, Gentle GH mobs inf and post,  PROM R shoulder flex, abd, ER, IR, scap mobility, posterior shoulder stretch in supine and S/L w/ one set CR . Modalities:  Patient received vasocompression on right shld for pain and inflammation for 10 min on low pressure. Patient had negative skin reaction afterwards. Communication with other providers:  POC faxed 3/23/21, See PN 4/19/21      Assessment:  Pt demonstrated overall good tolerance with today's session. Pt continues w/ some soft tissue restrictions and painful movements but is making progress w/ ROM and strength gains. She will benefit from PT to help reduce pain and inflammmation, restore movement pattern and strength in order to restore PLOF.     End pain: 0/10 following vaso    Plan for Next Session: continue manual work to R shoulder for posterior tightness and continue scap mobs,  joint mobs and ROM, advance ROM, stretching and strength to ruma, continue vaso/ice prn w/ educ for home      Time In / Time Out:  0840/0940        Timed Code/Total Treatment Minutes:  50/60    (1) TA, (1) TE, (1) MT, (1) vaso      Next Progress Note due:  See PN 4/19/21      Plan of Care Interventions:  [x] Therapeutic Exercise  [x] Modalities:  [x] Therapeutic Activity     [] Ultrasound  [] Estim  [] Gait Training      [] Cervical Traction [] Lumbar Traction  [x] Neuromuscular Re-education    [x] Cold/hotpack [] Iontophoresis   [x] Instruction in HEP      [x] Vasopneumatic   [] Dry Needling    [x] Manual Therapy               [] Aquatic Therapy              Electronically signed by:  Jose Caldera, MPT, ATC    4/30/2021, 7:03 AM    4/30/2021, 10:32 AM

## 2021-05-03 ENCOUNTER — HOSPITAL ENCOUNTER (OUTPATIENT)
Dept: PHYSICAL THERAPY | Age: 76
Setting detail: THERAPIES SERIES
Discharge: HOME | End: 2021-05-03
Payer: MEDICARE

## 2021-05-03 PROCEDURE — 97530 THERAPEUTIC ACTIVITIES: CPT

## 2021-05-03 PROCEDURE — 97016 VASOPNEUMATIC DEVICE THERAPY: CPT

## 2021-05-03 PROCEDURE — 97140 MANUAL THERAPY 1/> REGIONS: CPT

## 2021-05-03 PROCEDURE — 97110 THERAPEUTIC EXERCISES: CPT

## 2021-05-03 NOTE — FLOWSHEET NOTE
Outpatient Physical Therapy  [place]           [x] Phone: [phone]   Fax: [phone]  [place]           [] Phone: [phone]   Fax: [phone]        Physical Therapy Daily Treatment Note  Date:  5/3/2021    Patient Name:  Feliciano Deng    :  1945  MRN: [id number]  Restrictions/Precautions: Other position/activity restrictions: none  Diagnosis:   Diagnosis: R shoulder/deltoid pain  Date of Injury/Surgery:   Treatment Diagnosis: Treatment Diagnosis: R shoulder pain, stiffness, weakness, inflammation    Insurance/Certification information: PT Insurance Information: [id number]   Referring Physician:  Referring Practitioner: Manuela Bernstein  Next Doctor Visit:  Maybe  or July   Plan of care signed (Y/N): YES  Outcome Measure: DASH 66%  Visit# / total visits:   - POC  Pain level: 3-4/10      Goals:       Short term goals  Time Frame for Short term goals: 3 weeks  Short term goal 1: Pt will be able to report at least 25% reduction in pain  Met  Short term goal 2: Pt will be able to advance scap adn RTC work w/o increased pain  Met  Short term goal 3: Pt will be able to report improved sleep  Does ok unless lays on it, partially met   Long term goals  Time Frame for Long term goals : 6 weeks  Long term goal 1: Pt will be independent w/ HEP and able to continue to manage her pain on her own after PT  Good progress  Long term goal 2: Pt will be able to sleep w/o shoulder pain  Good progress  Long term goal 3: Pt will have improved DASH score by 20% or more  Met, continue  DASH 66% at [facility], 21: 33%    Summary of Evaluation: Assessment: Pt is a 77 yo female who presents w/ R shoulder pain. She has no Hx shoulder pain/injury. She demonstrates painful and limited shoulder ROM w/ decreased strength and TTP w/ + impingement signs. These limiations are affecting her sleep and abiltiy to perform usual ADL's. She will benefit from PT to help reduce pain and inflammmation and restore PLOF. Prior to 2021 she had no R shoulder pain. Subjective:   Pt states she is a little more painful today. Did not feel too bad over the weekend. Not sure why it is bothering her more today. Any changes in Ambulatory Summary Sheet? None        Objective:  COVID screening questions were asked and patient attested that there had been no contact or symptoms  Came in today with increase pain. Pt still w/ mild pain on UBE. Pt continues to demo tightness in teres and subscap. Increase TTP. Pt needs cues to avoid hiking shoulder with standing exercises.                     Exercises: (No more than 4 columns)   Exercise/Equipment 4/23/21  #9 4/28/21 #10 4/30/21  #11 5/3/21 #12            WARM UP UBE retro 2', 120 rpm UBE retro 2', 120 rpm UBE retro 2', 120 rpm UBE retro 2', 120 rpm      pulleys 10x flex and scap 10x flex and scap 10x flex and scap 10x flex and scap          TABLE       Supine press up No cane but 1# 10x, 0# 10x No cane but 1# 10x, 0# 10x No cane but 1# 10x2 1# 2x10   Supine flex  No cane,AROM 10x2 No cane,AROM 10x2 No cane,AROM 10x2 ARC's 1# 2x10   Horiz Abd  Supine 10x2 Supine 10x2 S/L 10x2 S/L 10x2   Supine ER @ 90 -  -      S/L ER  10x2, towel roll 10x2, towel roll Towel roll, 1# 10x2 Towel roll, 1# 10x2   MR scap retract in S/L  20x 20x 20x 20x   Supine SA punch 10x2 AAROM  1# 2x10 1# 2x10 1# 2x10   Supine ABC   1x   1x 1x  1x   Self inferior mobs --  -    Prone row  ext 10x, just to body  10x2 Prone Ext and row 2x10 ea 10x2  10x2 10x2  10x2                        STANDING       Door way stretch pec/bicep  -  -    Posterior shoulder stretch in door frame Man   -    scap squeezes  Taffy pull OTB 10x2 Taffy pull OTB 10x2 Taffy pull OTB 10x2 and B ext GTB 10x2  Taffy pull OTB 10x2 and   Ball on wall  10x ea dir, at or just below shld height  10x ea dir, at or just below shld height 15x ea dir, at or just below shld height 15x ea dir, at or just below shld height   Standing shld flex and

## 2021-05-21 ENCOUNTER — HOSPITAL ENCOUNTER (OUTPATIENT)
Dept: PHYSICAL THERAPY | Age: 76
Setting detail: THERAPIES SERIES
Discharge: HOME | End: 2021-05-21
Payer: MEDICARE

## 2021-05-21 PROCEDURE — 97530 THERAPEUTIC ACTIVITIES: CPT

## 2021-05-21 PROCEDURE — 97140 MANUAL THERAPY 1/> REGIONS: CPT

## 2021-05-21 PROCEDURE — 97110 THERAPEUTIC EXERCISES: CPT

## 2021-05-21 NOTE — FLOWSHEET NOTE
Outpatient Physical Therapy  [place]           [x] Phone: [phone]   Fax: [phone]  [facility]           [] Phone: [phone]   Fax: [phone]        Physical Therapy Daily Treatment Note  Date:  2021    Patient Name:  Bertin Benoit    :  1945  MRN: [id number]  Restrictions/Precautions: Other position/activity restrictions: none  Diagnosis:   Diagnosis: R shoulder/deltoid pain  Date of Injury/Surgery:   Treatment Diagnosis: Treatment Diagnosis: R shoulder pain, stiffness, weakness, inflammation    Insurance/Certification information: PT Insurance Information: [address]   Referring Physician:  Referring Practitioner: Greg Bosworth  Next Doctor Visit:  Maybe  or July   Plan of care signed (Y/N): YES  Outcome Measure: DASH 66%  Visit# / total visits:   - POC  Pain level: 2-3/10      Goals:       Short term goals  Time Frame for Short term goals: 3 weeks  Short term goal 1: Pt will be able to report at least 25% reduction in pain  Met  Short term goal 2: Pt will be able to advance scap adn RTC work w/o increased pain  Met  Short term goal 3: Pt will be able to report improved sleep  Does ok unless lays on it, partially met   Long term goals  Time Frame for Long term goals : 6 weeks  Long term goal 1: Pt will be independent w/ HEP and able to continue to manage her pain on her own after PT  Met  Long term goal 2: Pt will be able to sleep w/o shoulder pain Mostly met  Long term goal 3: Pt will have improved DASH score by 20% or more  Met   DASH 66% at [facility], 21: 33%, 21: 9%    Summary of Evaluation: Assessment: Pt is a 77 yo female who presents w/ R shoulder pain. She has no Hx shoulder pain/injury. She demonstrates painful and limited shoulder ROM w/ decreased strength and TTP w/ + impingement signs. These limiations are affecting her sleep and abiltiy to perform usual ADL's. She will benefit from PT to help reduce pain and inflammmation and restore PLOF.   Prior to  she had no R shoulder pain. Subjective:  Still gets some increased pain w/ increased activity and then needs a day of rest or so. Doesn't return to doc until August.  Would like to just hold PT until then and see how she does. Exercises do help. Pain today 2-3/10, max still up to 7/10 and more activity dependent. Any changes in Ambulatory Summary Sheet? None        Objective:  COVID screening questions were asked and patient attested that there had been no contact or symptoms     Some pain in tricep w/ pulleys into scap plane and also with reaching out.    AROM seated flex 146    abd 123 w/ some pain and also pulling in tricep   IR and ER scratch mostly WNL but some pulling in tricep again w/ ER    Full PROM w/ painful arc  MMT:  mostly WNL but some tricep pain w/ flex  Pt has very tender and noted tissue restriction in teres and subscap which responds fairly well to manual work and seems to be referring pain to posterior arm/tricep.                     Exercises: (No more than 4 columns)   Exercise/Equipment 4/30/21  #11 5/3/21 #12 5/21/20  #13           WARM UP UBE retro 2', 120 rpm UBE retro 2', 120 rpm --      pulleys 10x flex and scap 10x flex and scap 20x ea way          TABLE      Supine press up No cane but 1# 10x2 1# 2x10 1# 10x2    Supine flex  No cane,AROM 10x2 1# 2x10 1# 2x10   Horiz Abd  S/L 10x2 S/L 10x2 S/L 10x2   Supine ER @ 90 -       S/L ER  Towel roll, 1# 10x2 Towel roll, 1# 10x2 Towel roll, 1# 10x2   MR scap retract in S/L  20x 20x -   Supine SA punch 1# 2x10 1# 2x10 -   Supine ABC 1x  1x 1# 1x   Self inferior mobs -     Prone row  ext 10x2  10x2 10x2  10x2 10x2  10x2                     STANDING      Door way stretch pec/bicep  -     Posterior shoulder stretch in door frame -  tricep stretch  30\"    scap squeezes  Taffy pull OTB 10x2 and B ext GTB 10x2  Taffy pull OTB 10x2 and -   Ball on wall  15x ea dir, at or just below shld height 15x ea dir, at or just below shld height - Standing shld flex and scap to 90 deg scap back to wall 10x2 scap back to wall 10x2 -   Wall push up  20x 20x -              PROPRIOCEPTION                                    MODALITIES      Vaso  10' 10'              Other Therapeutic Activities/Education:  3/23/2021: Patient received education on their current pathology and how their condition effects them with their functional activities. Patient understood discussion and questions were answered. Patient understands their activity limitations and understands rational for treatment progression. Home Exercise Program:  Issued, practiced and pt demo ability to perform 3/23/2021   Added isometrics ( flexion, extension, and abduction) sub max against wall ( 3/26/2021)  Added standing, back against wall,  flex and scaption to 90 deg and shld ext using cane. Provided with handouts. 4/19/21: updated HEP   5/21: issued tricep stretch and RTB    Manual Treatments:  S/L  scap mobs and STM/MFR to teres/subscap, Gentle GH mobs inf and post,  PROM R shoulder flex, abd, ER, IR, scap mobility, posterior shoulder stretch insupine. Modalities:      Communication with other providers:  POC faxed 3/23/21, See PN 4/19/21, 5/21/21      Assessment:  Pt demonstrated overall good tolerance with today's session. Pt continues w/ some soft tissue restrictions and painful movements but is making progress w/ ROM and strength gains and doing well w/ HEP. Pt should do well on her own, but will follow up w/ doctor prn.    End pain: 0-1/10      Plan for Next Session:  dc      Time In / Time Out:  8133/1381         Timed Code/Total Treatment Minutes:  45/45    (1) TA, (1) TE, (1) MT       Next Progress Note due:  See PN 4/19/21, 5/21/21      Plan of Care Interventions:  [x] Therapeutic Exercise  [x] Modalities:  [x] Therapeutic Activity     [] Ultrasound  [] Estim  [] Gait Training      [] Cervical Traction [] Lumbar Traction  [x] Neuromuscular Re-education    [x] Cold/hotpack []

## 2021-05-21 NOTE — DISCHARGE SUMMARY
demonstrated overall good tolerance with today's session. Pt continues w/ some soft tissue restrictions and painful movements but is making progress w/ ROM and strength gains and doing well w/ HEP. Pt should do well on her own, but will follow up w/ doctor prn. End pain: 0-1/10      Goal Status:               []? Achieved    [x]? Partially Achieved   []? Not Achieved           Short term goals  Time Frame for Short term goals: 3 weeks  Short term goal 1: Pt will be able to report at least 25% reduction in pain  Met  Short term goal 2: Pt will be able to advance scap adn RTC work w/o increased pain  Met  Short term goal 3: Pt will be able to report improved sleep  Does ok unless lays on it, partially met   Long term goals  Time Frame for Long term goals : 6 weeks  Long term goal 1: Pt will be independent w/ HEP and able to continue to manage her pain on her own after PT  Met  Long term goal 2: Pt will be able to sleep w/o shoulder pain Mostly met  Long term goal 3: Pt will have improved DASH score by 20% or more  Met   DASH 66% at [facility], 4/19/21: 33%, 5/21/21: 9%    Frequency/Duration:  # Days per week: [] 1 day # Weeks: [] 1 week [] 4 weeks [x] 8 weeks     [x] 2 days   [] 2 weeks [] 5 weeks [] 10 weeks     [] 3 days   [] 3 weeks [x] 6 weeks [] 12 weeks       Rehab Potential: [] Excellent [x] Good [] Fair  [] Poor         Patient Status: [] Continue per initial plan of Care     [x] Patient now discharged     [] Additional visits requested, Please re-certify for additional visits:      Requested frequency/duration:       If we are requesting more visits, we fully anticipate the patient's condition is expected to improve within the treatment timeframe we are requesting. Electronically signed by:  Aline Stuart, PT, PT, MPT, ATC  5/21/2021, 6:44 AM    5/21/2021, 9:24 AM    If you have any questions or concerns, please don't hesitate to call.   Thank you for your referral.

## 2021-08-10 ENCOUNTER — HOSPITAL ENCOUNTER (OUTPATIENT)
Dept: WOMENS IMAGING | Age: 76
Discharge: HOME | End: 2021-08-10
Payer: MEDICARE

## 2021-08-10 DIAGNOSIS — Z79.83 PERSONAL HISTORY OF ONGOING TREATMENT WITH ALENDRONATE (FOSAMAX): ICD-10-CM

## 2021-08-10 DIAGNOSIS — M81.0 OSTEOPOROSIS, UNSPECIFIED OSTEOPOROSIS TYPE, UNSPECIFIED PATHOLOGICAL FRACTURE PRESENCE: ICD-10-CM

## 2021-08-10 PROCEDURE — 77080 DXA BONE DENSITY AXIAL: CPT

## 2022-04-22 ENCOUNTER — APPOINTMENT (OUTPATIENT)
Dept: CT IMAGING | Age: 77
End: 2022-04-22
Payer: MEDICARE

## 2022-04-22 ENCOUNTER — APPOINTMENT (OUTPATIENT)
Dept: GENERAL RADIOLOGY | Age: 77
End: 2022-04-22
Payer: MEDICARE

## 2022-04-22 ENCOUNTER — HOSPITAL ENCOUNTER (OUTPATIENT)
Age: 77
Setting detail: OBSERVATION
Discharge: HOME | End: 2022-04-23
Attending: STUDENT IN AN ORGANIZED HEALTH CARE EDUCATION/TRAINING PROGRAM | Admitting: INTERNAL MEDICINE
Payer: MEDICARE

## 2022-04-22 DIAGNOSIS — R47.02 EXPRESSIVE DYSPHASIA: Primary | ICD-10-CM

## 2022-04-22 PROBLEM — G45.9 TIA (TRANSIENT ISCHEMIC ATTACK): Status: ACTIVE | Noted: 2022-04-22

## 2022-04-22 LAB
ALBUMIN SERPL-MCNC: 4.4 GM/DL (ref 3.4–5)
ALP BLD-CCNC: 55 IU/L (ref 40–129)
ALT SERPL-CCNC: 18 U/L (ref 10–40)
ANION GAP SERPL CALCULATED.3IONS-SCNC: 13 MMOL/L (ref 4–16)
AST SERPL-CCNC: 31 IU/L (ref 15–37)
BASOPHILS ABSOLUTE: 0 K/CU MM
BASOPHILS RELATIVE PERCENT: 0.5 % (ref 0–1)
BILIRUB SERPL-MCNC: 0.3 MG/DL (ref 0–1)
BUN BLDV-MCNC: 15 MG/DL (ref 6–23)
CALCIUM SERPL-MCNC: 8.9 MG/DL (ref 8.3–10.6)
CHLORIDE BLD-SCNC: 100 MMOL/L (ref 99–110)
CHP ED QC CHECK: NORMAL
CO2: 25 MMOL/L (ref 21–32)
CREAT SERPL-MCNC: 0.7 MG/DL (ref 0.6–1.1)
DIFFERENTIAL TYPE: ABNORMAL
EKG ATRIAL RATE: 87 BPM
EKG DIAGNOSIS: NORMAL
EKG P AXIS: 73 DEGREES
EKG P-R INTERVAL: 148 MS
EKG Q-T INTERVAL: 376 MS
EKG QRS DURATION: 86 MS
EKG QTC CALCULATION (BAZETT): 452 MS
EKG R AXIS: 75 DEGREES
EKG T AXIS: 72 DEGREES
EKG VENTRICULAR RATE: 87 BPM
EOSINOPHILS ABSOLUTE: 0.1 K/CU MM
EOSINOPHILS RELATIVE PERCENT: 2.3 % (ref 0–3)
GFR AFRICAN AMERICAN: >60 ML/MIN/1.73M2
GFR NON-AFRICAN AMERICAN: >60 ML/MIN/1.73M2
GLUCOSE BLD-MCNC: 124 MG/DL (ref 70–99)
GLUCOSE BLD-MCNC: 131 MG/DL
GLUCOSE BLD-MCNC: 131 MG/DL (ref 70–99)
HCT VFR BLD CALC: 35.9 % (ref 37–47)
HEMOGLOBIN: 12 GM/DL (ref 12.5–16)
IMMATURE NEUTROPHIL %: 0.2 % (ref 0–0.43)
INR BLD: 0.88 INDEX
LYMPHOCYTES ABSOLUTE: 1.9 K/CU MM
LYMPHOCYTES RELATIVE PERCENT: 30.8 % (ref 24–44)
MCH RBC QN AUTO: 30.9 PG (ref 27–31)
MCHC RBC AUTO-ENTMCNC: 33.4 % (ref 32–36)
MCV RBC AUTO: 92.5 FL (ref 78–100)
MONOCYTES ABSOLUTE: 0.6 K/CU MM
MONOCYTES RELATIVE PERCENT: 10.4 % (ref 0–4)
NUCLEATED RBC %: 0 %
PDW BLD-RTO: 13.2 % (ref 11.7–14.9)
PLATELET # BLD: 254 K/CU MM (ref 140–440)
PMV BLD AUTO: 9.4 FL (ref 7.5–11.1)
POTASSIUM SERPL-SCNC: 3.9 MMOL/L (ref 3.5–5.1)
PROTHROMBIN TIME: 11.3 SECONDS (ref 11.7–14.5)
RBC # BLD: 3.88 M/CU MM (ref 4.2–5.4)
SEGMENTED NEUTROPHILS ABSOLUTE COUNT: 3.4 K/CU MM
SEGMENTED NEUTROPHILS RELATIVE PERCENT: 55.8 % (ref 36–66)
SODIUM BLD-SCNC: 138 MMOL/L (ref 135–145)
T4 FREE: 1.24 NG/DL (ref 0.9–1.8)
TOTAL IMMATURE NEUTOROPHIL: 0.01 K/CU MM
TOTAL NUCLEATED RBC: 0 K/CU MM
TOTAL PROTEIN: 7.1 GM/DL (ref 6.4–8.2)
TROPONIN T: <0.01 NG/ML
TSH HIGH SENSITIVITY: 0.29 UIU/ML (ref 0.27–4.2)
WBC # BLD: 6.2 K/CU MM (ref 4–10.5)

## 2022-04-22 PROCEDURE — 80053 COMPREHEN METABOLIC PANEL: CPT

## 2022-04-22 PROCEDURE — 6360000004 HC RX CONTRAST MEDICATION: Performed by: STUDENT IN AN ORGANIZED HEALTH CARE EDUCATION/TRAINING PROGRAM

## 2022-04-22 PROCEDURE — 70450 CT HEAD/BRAIN W/O DYE: CPT

## 2022-04-22 PROCEDURE — 84484 ASSAY OF TROPONIN QUANT: CPT

## 2022-04-22 PROCEDURE — 2580000003 HC RX 258: Performed by: INTERNAL MEDICINE

## 2022-04-22 PROCEDURE — 70496 CT ANGIOGRAPHY HEAD: CPT

## 2022-04-22 PROCEDURE — 84443 ASSAY THYROID STIM HORMONE: CPT

## 2022-04-22 PROCEDURE — 85025 COMPLETE CBC W/AUTO DIFF WBC: CPT

## 2022-04-22 PROCEDURE — 85610 PROTHROMBIN TIME: CPT

## 2022-04-22 PROCEDURE — 6370000000 HC RX 637 (ALT 250 FOR IP): Performed by: NURSE PRACTITIONER

## 2022-04-22 PROCEDURE — 84439 ASSAY OF FREE THYROXINE: CPT

## 2022-04-22 PROCEDURE — 93010 ELECTROCARDIOGRAM REPORT: CPT | Performed by: INTERNAL MEDICINE

## 2022-04-22 PROCEDURE — 71045 X-RAY EXAM CHEST 1 VIEW: CPT

## 2022-04-22 PROCEDURE — G0378 HOSPITAL OBSERVATION PER HR: HCPCS

## 2022-04-22 PROCEDURE — 93005 ELECTROCARDIOGRAM TRACING: CPT | Performed by: STUDENT IN AN ORGANIZED HEALTH CARE EDUCATION/TRAINING PROGRAM

## 2022-04-22 PROCEDURE — 82962 GLUCOSE BLOOD TEST: CPT

## 2022-04-22 PROCEDURE — 99285 EMERGENCY DEPT VISIT HI MDM: CPT

## 2022-04-22 PROCEDURE — 6370000000 HC RX 637 (ALT 250 FOR IP): Performed by: INTERNAL MEDICINE

## 2022-04-22 RX ORDER — LABETALOL HYDROCHLORIDE 5 MG/ML
10 INJECTION, SOLUTION INTRAVENOUS EVERY 10 MIN PRN
Status: DISCONTINUED | OUTPATIENT
Start: 2022-04-22 | End: 2022-04-23 | Stop reason: HOSPADM

## 2022-04-22 RX ORDER — SODIUM CHLORIDE 9 MG/ML
INJECTION, SOLUTION INTRAVENOUS PRN
Status: DISCONTINUED | OUTPATIENT
Start: 2022-04-22 | End: 2022-04-23 | Stop reason: HOSPADM

## 2022-04-22 RX ORDER — SODIUM CHLORIDE 0.9 % (FLUSH) 0.9 %
5-40 SYRINGE (ML) INJECTION PRN
Status: DISCONTINUED | OUTPATIENT
Start: 2022-04-22 | End: 2022-04-23 | Stop reason: HOSPADM

## 2022-04-22 RX ORDER — ASPIRIN 300 MG/1
300 SUPPOSITORY RECTAL DAILY
Status: DISCONTINUED | OUTPATIENT
Start: 2022-04-22 | End: 2022-04-23 | Stop reason: HOSPADM

## 2022-04-22 RX ORDER — ROSUVASTATIN CALCIUM 20 MG/1
40 TABLET, COATED ORAL NIGHTLY
Status: DISCONTINUED | OUTPATIENT
Start: 2022-04-22 | End: 2022-04-23 | Stop reason: HOSPADM

## 2022-04-22 RX ORDER — ONDANSETRON 2 MG/ML
4 INJECTION INTRAMUSCULAR; INTRAVENOUS EVERY 6 HOURS PRN
Status: DISCONTINUED | OUTPATIENT
Start: 2022-04-22 | End: 2022-04-23 | Stop reason: HOSPADM

## 2022-04-22 RX ORDER — LANOLIN ALCOHOL/MO/W.PET/CERES
3 CREAM (GRAM) TOPICAL NIGHTLY PRN
Status: DISCONTINUED | OUTPATIENT
Start: 2022-04-22 | End: 2022-04-23 | Stop reason: HOSPADM

## 2022-04-22 RX ORDER — ACETAMINOPHEN 325 MG/1
650 TABLET ORAL EVERY 4 HOURS PRN
Status: DISCONTINUED | OUTPATIENT
Start: 2022-04-22 | End: 2022-04-23 | Stop reason: HOSPADM

## 2022-04-22 RX ORDER — POLYETHYLENE GLYCOL 3350 17 G/17G
17 POWDER, FOR SOLUTION ORAL DAILY PRN
Status: DISCONTINUED | OUTPATIENT
Start: 2022-04-22 | End: 2022-04-23 | Stop reason: HOSPADM

## 2022-04-22 RX ORDER — ASPIRIN 81 MG/1
81 TABLET ORAL DAILY
Status: DISCONTINUED | OUTPATIENT
Start: 2022-04-22 | End: 2022-04-23 | Stop reason: HOSPADM

## 2022-04-22 RX ORDER — SODIUM CHLORIDE 0.9 % (FLUSH) 0.9 %
5-40 SYRINGE (ML) INJECTION EVERY 12 HOURS SCHEDULED
Status: DISCONTINUED | OUTPATIENT
Start: 2022-04-22 | End: 2022-04-23 | Stop reason: HOSPADM

## 2022-04-22 RX ORDER — ONDANSETRON 4 MG/1
4 TABLET, ORALLY DISINTEGRATING ORAL EVERY 8 HOURS PRN
Status: DISCONTINUED | OUTPATIENT
Start: 2022-04-22 | End: 2022-04-23 | Stop reason: HOSPADM

## 2022-04-22 RX ADMIN — SODIUM CHLORIDE, PRESERVATIVE FREE 10 ML: 5 INJECTION INTRAVENOUS at 20:47

## 2022-04-22 RX ADMIN — IOPAMIDOL 75 ML: 755 INJECTION, SOLUTION INTRAVENOUS at 15:00

## 2022-04-22 RX ADMIN — ROSUVASTATIN CALCIUM 40 MG: 20 TABLET, FILM COATED ORAL at 20:47

## 2022-04-22 RX ADMIN — ASPIRIN 81 MG: 81 TABLET, COATED ORAL at 20:47

## 2022-04-22 RX ADMIN — MELATONIN 3 MG: at 20:47

## 2022-04-22 RX ADMIN — ACETAMINOPHEN 650 MG: 325 TABLET ORAL at 20:47

## 2022-04-22 ASSESSMENT — PAIN DESCRIPTION - DESCRIPTORS
DESCRIPTORS: DULL
DESCRIPTORS: DISCOMFORT

## 2022-04-22 ASSESSMENT — PAIN DESCRIPTION - ORIENTATION: ORIENTATION: ANTERIOR;POSTERIOR

## 2022-04-22 ASSESSMENT — PAIN DESCRIPTION - PAIN TYPE
TYPE: ACUTE PAIN
TYPE: ACUTE PAIN

## 2022-04-22 ASSESSMENT — PAIN SCALES - GENERAL
PAINLEVEL_OUTOF10: 3
PAINLEVEL_OUTOF10: 6
PAINLEVEL_OUTOF10: 2

## 2022-04-22 ASSESSMENT — PAIN - FUNCTIONAL ASSESSMENT: PAIN_FUNCTIONAL_ASSESSMENT: ACTIVITIES ARE NOT PREVENTED

## 2022-04-22 ASSESSMENT — PAIN DESCRIPTION - LOCATION
LOCATION: HEAD

## 2022-04-22 NOTE — ED NOTES
DR Weathers Hence states if cta ok admit for further work up,no TPA if cta abnormal call him back     Preston Ramon RN  04/22/22 [id number]

## 2022-04-22 NOTE — ED NOTES
[address] [facility] paged hospitalist     Flavio Toro  04/22/22 1622 [id number] hospitalist returned call      Flavio Toro  04/22/22 2068

## 2022-04-22 NOTE — ED PROVIDER NOTES
Emergency Department Encounter    Patient: Tj Shen  MRN: [id number]  : 1945  Date of Evaluation: 2022  ED Provider:  Milo العلي MD    Triage Chief Complaint:   Aphasia (Started 2 hours ago) and Headache    [place]:  Tj Shen is a 78 y.o. female with history of below presenting with complaints of difficulty finding words. Patient states about 1 to 2 hours prior to presentation she was having lunch and she began having diffuse headache and difficulty finding her words. Patient states she felt lightheadedness/dizziness with blurred vision. Patient states she has had a previous stroke several years ago and had similar symptoms at that time. States symptoms do seem to be improving some but states the continue to be present on my initial evaluation. Denies chest pain, shortness of breath, cough, acute production, fevers or chills, abdominal pain, nausea vomiting, diarrhea constipation. Denies any motor or sensory changes. Denies recent falls or trauma. Denies fever or chills. ROS - see HPI, below listed is current ROS at time of my eval:  At least 14 systems reviewed, negative other HPI    Past Medical History:   Diagnosis Date    Gyn exam     per Dr. Alex Leon History of colonoscopy 10/2008    per Dr. Olson Dural few diverticula otherwise normal 10yr fup recommended    Osteoporosis     Shingles     s/p episode    White matter changes     chronic ischemic changes     Past Surgical History:   Procedure Laterality Date    BREAST ENHANCEMENT SURGERY       History reviewed. No pertinent family history. Social History     Socioeconomic History    Marital status:      Spouse name: Not on file    Number of children: Not on file    Years of education: Not on file    Highest education level: Not on file   Occupational History    Not on file   Tobacco Use    Smoking status: Never Smoker    Smokeless tobacco: Never Used   Substance and Sexual Activity    Alcohol use:  No Comment: 2 cups of tea per day    Drug use: Not on file    Sexual activity: Not on file   Other Topics Concern    Not on file   Social History Narrative    Not on file     Social Determinants of Health     Financial Resource Strain:     Difficulty of Paying Living Expenses: Not on file   Food Insecurity:     Worried About Running Out of Food in the Last Year: Not on file    Naun of Food in the Last Year: Not on file   Transportation Needs:     Lack of Transportation (Medical): Not on file    Lack of Transportation (Non-Medical): Not on file   Physical Activity:     Days of Exercise per Week: Not on file    Minutes of Exercise per Session: Not on file   Stress:     Feeling of Stress : Not on file   Social Connections:     Frequency of Communication with Friends and Family: Not on file    Frequency of Social Gatherings with Friends and Family: Not on file    Attends Faith Services: Not on file    Active Member of [address] [place] [address] or Organizations: Not on file    Attends Club or Organization Meetings: Not on file    Marital Status: Not on file   Intimate Partner Violence:     Fear of Current or Ex-Partner: Not on file    Emotionally Abused: Not on file    Physically Abused: Not on file    Sexually Abused: Not on file   Housing Stability:     Unable to Pay for Housing in the Last Year: Not on file    Number of [place] in the Last Year: Not on file    Unstable Housing in the Last Year: Not on file     No current facility-administered medications for this encounter. Current Outpatient Medications   Medication Sig Dispense Refill    folic acid-pyridoxine-cyancobalamin (FOLBIC) 2.5-25-2 MG TABS Take 1 tablet by mouth daily 90 tablet 3    gabapentin (NEURONTIN) 300 MG capsule Take 1 capsule by mouth daily for 30 days.  30 capsule 0    risedronate (ACTONEL) 35 MG tablet TAKE 1 TABLET BY MOUTH ONCE WEEKLY BEFORE BREAKFAST, ON AN EMPTY STOMACH: REMAIN UPRIGHT FOR 30 MINUTES:TAKE WITH 8 OUNCES OF WATER 4 tablet 4    calcipotriene (DOVONEX) 0.005 % ointment Apply topically to affected areas (elbows, back, knees) 2 times daily. 60 g 3    hydrocortisone (WESTCORT) 0.2 % cream Apply twice daily as needed 45 g 3    VIRT-NAZIA FORTE 2.5-25-2 MG TABS TAKE 1 TABLET BY MOUTH ONE TIME A DAY  90 tablet 3    Biotin 1000 MCG TABS Take 1 tablet by mouth daily      Calcium Citrate-Vitamin D (CITRACAL + D PO) Take  by mouth.  Garlic 8980 MG CAPS Take  by mouth 2 times daily.  halobetasol (ULTRAVATE) 0.05 % cream Apply  topically 2 times daily. 1 Tube 1    Omega-3 Fatty Acids (OMEGA-3 FISH OIL PO) Take 1 tablet by mouth daily.  Multiple Vitamins-Minerals (ONE-A-DAY 50 PLUS PO) Take 1 tablet by mouth daily. Allergies   Allergen Reactions    Amoxicillin-Pot Clavulanate Diarrhea    Evista [Raloxifene Hydrochloride]        Nursing Notes Reviewed    Physical Exam:  Triage VS:    ED Triage Vitals   Enc Vitals Group      BP       Pulse       Resp       Temp       Temp src       SpO2       Weight       Height       Head Circumference       Peak Flow       Pain Score       Pain Loc       Pain Edu? Excl. in [address]? My pulse ox interpretation is  normal    General appearance:  No acute distress. Skin:  Warm. Dry. Eye:  Extraocular movements intact. Ears, nose, mouth and throat:  Oral mucosa moist   Neck:  Trachea midline. Extremity:  No swelling. Normal ROM     Heart:  Regular rate and rhythm, normal S1 & S2, no extra heart sounds. Perfusion:  intact  Respiratory:  Lungs clear to auscultation bilaterally. Respirations nonlabored. Abdominal:  Normal bowel sounds. Soft. Nontender. Non distended. Back:  No CVA tenderness to palpation     Neurological:  Alert and oriented times 3. No focal neuro deficits.   No facial asymmetry, normal sensation light touch of the face, extraocular movements are intact, pupils are 3 mm reactive bilaterally, no pronator drift of bilateral upper and lower extremities, normal sensation light touch of the bilateral upper and lower extremities, normal finger-nose-finger and heel shin, on initial evaluation patient does seem to be having difficulty finding words. Patient is alert and oriented x3. Psychiatric:  Appropriate    I have reviewed and interpreted all of the currently available lab results from this visit (if applicable):  No results found for this visit on 04/22/22. Radiographs (if obtained):  Radiologist's Report Reviewed:  No results found. EKG (if obtained): (All EKG's are interpreted by myself in the absence of a cardiologist)  Normal sinus rhythm, ventricular rate 87, WV interval 140, QRS duration 86, QTc 452, very mild depressions in inferior and anterior lateral leads which is not significantly changed from prior exam, no ST elevation    MDM:    44-year-old female presenting with complaints of expressive aphasia, headache, blurred vision. Patient states she has had a stroke previously with similar symptoms. Patient states symptoms started about 1 to 2 hours prior to presentation. Vitals on presentation patient is hypertensive 188/80. Patient is afebrile satting well on room air. Physical exam can be seen above. Stroke alert was called as patient continued to have word finding difficulties although she states they are improving some. CT head is nonacute. Stroke neurology [facility] will evaluate patient by that time patient's symptoms have totally resolved. CT head is nonacute. CTA head neck shows no acute arterial abnormality or hemodynamically significant arterial stenosis of the head or neck. There is multinodular enlargement of the thyroid gland follow-up outpatient thyroid ultrasound is recommended. Chest x-ray shows no acute consolidation or infiltrate. There is a tiny right suprahilar nodular density which could represent follow-up pulmonary vascularity a true lung nodule cannot be excluded.   There is a proximal left humeral density. CBC is reassuring without leukocytosis. Hemoglobin is stable for patient. CMP is unremarkable. [address] is within normal limits. Patient will be admitted to hospital service for MRI and further stroke work-up. Patient agreeable to plan. Primary doctor called patient admitted to their service. Clinical Impression:  1. Expressive dysphasia          Total critical care time provided today was 32 minutes. This excludes seperately billable procedures and family discussion time. Critical care time provided for obtaining history, conducting a physical exam, performing and monitoring interventions, ordering, collecting and interpreting tests, and establishing medical decision-making. There was a potential for life/limb threatening pathology requiring close evaluation and intervention with concern for patient decompensation. Comment: Please note this report has been produced using speech recognition software and may contain errors related to that system including errors in grammar, punctuation, and spelling, as well as words and phrases that may be inappropriate. Efforts were made to edit the dictations.         Sarah Sheridan MD  04/25/22 1320

## 2022-04-22 NOTE — H&P
[facility] History and Physical    Perpetual assessment: 51-year-old female with past medical history of CVA and osteoporosis presents with dysarthria. Assessment and plan:    TIA  -Suspect secondary to microvascular disease  -Also possible for a complex migraine  -CT CTA negative for acute bleed or LVO  -Was a stroke alert in ED  -Not a tPA candidate due to symptom resolution  -We will proceed with MRI  -Check lipid profile and A1c  -Start atorvastatin aspirin  -Consult neurology if MRI positive  -Permissive hypertension for now    Essential hypertension  -Blood pressure noted to be elevated on arrival  -No previous history of hypertension  -As needed labetalol for systolic blood pressure greater than 220  -Initiate lisinopril on discharge    Multinodular thyroid  -Seen on imaging today  -We will check TSH and T4  -Monitor continue outpatient work-up    Osteoporosis  -Known history of hip fracture  -Has stopped taking her bisphosphonate    CODE STATUS: Full  DVT prophylaxis: SCDs    CHIEF COMPLAINT: Dysarthria    History of Present Illness: This is a 51-year-old female who presents to [facility] [place] with above-stated complaint. All history has been taken from the patient and review of medical records. Patient was having lunch 1 after that she began to have a headache. Subsequently she began to have difficulty speaking. She also felt lightheaded and had some difficulty with her vision. Because of a previous history of stroke approximately 10 years ago she thought she was having repeat symptoms. Because she came to the ED for evaluation. By the time she arrived her symptoms had quickly dissipated. She was instructed to the ED. Teleneurology did eval the patient. Did not feel she was a tPA candidate. Because of her symptoms and concern for TIA/CVA she was admitted for further evaluation and treatment.     Informant(s) for H&P:    REVIEW OF SYSTEMS:  A comprehensive review of systems was negative except for: what is in the HPI      PMH:  Past Medical History:   Diagnosis Date    Gyn exam     per Dr. Kristin Feldman History of colonoscopy 10/2008    per Dr. Ann Montejo few diverticula otherwise normal 10yr fup recommended    Osteoporosis     Shingles     s/p episode    White matter changes     chronic ischemic changes       Surgical History:  Past Surgical History:   Procedure Laterality Date    BREAST ENHANCEMENT SURGERY         Medications Prior to Admission:    Prior to Admission medications    Medication Sig Start Date End Date Taking? Authorizing Provider   folic acid-pyridoxine-cyancobalamin (FOLBIC) 2.5-25-2 MG TABS Take 1 tablet by mouth daily 1/28/20   Elliot Lechuga DO   hydrocortisone [facility]) 0.2 % cream Apply twice daily as needed 11/2/18   Sirena Rosas MD       Allergies:    Amoxicillin-pot clavulanate and Evista [raloxifene hydrochloride]    Social History:    reports that she has never smoked. She has never used smokeless tobacco. She reports that she does not drink alcohol. Family History:   family history includes Diabetes in her maternal grandmother.        PHYSICAL EXAM:  Vitals:  BP (!) 168/51   Pulse 67   Temp 97.9 °F (36.6 °C) (Oral)   Resp 12   Ht 5' 1\" (1.549 m)   Wt 120 lb (54.4 kg)   SpO2 100%   BMI 22.67 kg/m²     General Appearance: alert and oriented to person, place and time and in no acute distress  Skin: warm and dry  Head: normocephalic and atraumatic  Eyes: pupils equal, round, and reactive to light, extraocular eye movements intact, conjunctivae normal  Neck: neck supple and non tender without mass   Pulmonary/Chest: clear to auscultation bilaterally- no wheezes, rales or rhonchi, normal air movement, no respiratory distress  Cardiovascular: normal rate, normal S1 and S2 and no carotid bruits  Abdomen: soft, non-tender, non-distended, normal bowel sounds, no masses or organomegaly  Extremities: no cyanosis, no clubbing and no edema  Neurologic: no cranial nerve deficit and speech normal        LABS:  Recent Labs     04/22/22  1445 04/22/22  1453     --    K 3.9  --      --    CO2 25  --    BUN 15  --    CREATININE 0.7  --    GLUCOSE 124* 131   CALCIUM 8.9  --        Recent Labs     04/22/22  1445   WBC 6.2   RBC 3.88*   HGB 12.0*   HCT 35.9*   MCV 92.5   MCH 30.9   MCHC 33.4   RDW 13.2      MPV 9.4       Recent Labs     04/22/22  1438   POCGLU 131*           Radiology:   XR CHEST PORTABLE   Final Result   No acute consolidation or infiltrate. Tiny right suprahilar nodular density,   which could represent part of pulmonary vascularity. A true lung nodule   cannot be excluded. Correlation should be made with prior radiographs. Proximal left humeral density, as described above. Dedicated left shoulder   radiographs are recommended. CTA HEAD NECK W CONTRAST   Final Result   1. No acute arterial abnormality or hemodynamically significant arterial   stenosis in the head or neck. 2. Multinodular enlargement of the thyroid gland. Follow-up outpatient   thyroid ultrasound is recommended for further evaluation per guidelines below. RECOMMENDATIONS:   1.8 cm incidental left thyroid nodule with heterogeneous and enlarged   thyroid. Recommend thyroid US. Reference: J Am Eugenie Radiol. 2015 Feb;12(2): 143-50         CT HEAD WO CONTRAST   Final Result   No acute intracranial abnormality. MRI brain without contrast    (Results Pending)       EKG: NSR      NOTE: This report was transcribed using voice recognition software. Every effort was made to ensure accuracy; however, inadvertent computerized transcription errors may be present.   Electronically signed by Jodie Parker MD on 4/22/2022 at 4:54 PM

## 2022-04-23 ENCOUNTER — APPOINTMENT (OUTPATIENT)
Dept: MRI IMAGING | Age: 77
End: 2022-04-23
Payer: MEDICARE

## 2022-04-23 VITALS
WEIGHT: 120 LBS | RESPIRATION RATE: 16 BRPM | BODY MASS INDEX: 22.66 KG/M2 | TEMPERATURE: 98.5 F | OXYGEN SATURATION: 94 % | HEIGHT: 61 IN | HEART RATE: 63 BPM | DIASTOLIC BLOOD PRESSURE: 56 MMHG | SYSTOLIC BLOOD PRESSURE: 144 MMHG

## 2022-04-23 LAB
ANION GAP SERPL CALCULATED.3IONS-SCNC: 11 MMOL/L (ref 4–16)
BUN BLDV-MCNC: 14 MG/DL (ref 6–23)
CALCIUM SERPL-MCNC: 8.5 MG/DL (ref 8.3–10.6)
CHLORIDE BLD-SCNC: 106 MMOL/L (ref 99–110)
CHOLESTEROL: 255 MG/DL
CO2: 24 MMOL/L (ref 21–32)
CREAT SERPL-MCNC: 0.7 MG/DL (ref 0.6–1.1)
ESTIMATED AVERAGE GLUCOSE: 123 MG/DL
GFR AFRICAN AMERICAN: >60 ML/MIN/1.73M2
GFR NON-AFRICAN AMERICAN: >60 ML/MIN/1.73M2
GLUCOSE BLD-MCNC: 97 MG/DL (ref 70–99)
HBA1C MFR BLD: 5.9 % (ref 4.2–6.3)
HCT VFR BLD CALC: 36 % (ref 37–47)
HDLC SERPL-MCNC: 68 MG/DL
HEMOGLOBIN: 12 GM/DL (ref 12.5–16)
LDL CHOLESTEROL CALCULATED: 174 MG/DL
MCH RBC QN AUTO: 30.8 PG (ref 27–31)
MCHC RBC AUTO-ENTMCNC: 33.3 % (ref 32–36)
MCV RBC AUTO: 92.3 FL (ref 78–100)
PDW BLD-RTO: 13.2 % (ref 11.7–14.9)
PLATELET # BLD: 235 K/CU MM (ref 140–440)
PMV BLD AUTO: 9.1 FL (ref 7.5–11.1)
POTASSIUM SERPL-SCNC: 3.6 MMOL/L (ref 3.5–5.1)
RBC # BLD: 3.9 M/CU MM (ref 4.2–5.4)
SODIUM BLD-SCNC: 141 MMOL/L (ref 135–145)
TRIGL SERPL-MCNC: 66 MG/DL
WBC # BLD: 4.7 K/CU MM (ref 4–10.5)

## 2022-04-23 PROCEDURE — 94761 N-INVAS EAR/PLS OXIMETRY MLT: CPT

## 2022-04-23 PROCEDURE — 36415 COLL VENOUS BLD VENIPUNCTURE: CPT

## 2022-04-23 PROCEDURE — 70551 MRI BRAIN STEM W/O DYE: CPT

## 2022-04-23 PROCEDURE — 2580000003 HC RX 258: Performed by: INTERNAL MEDICINE

## 2022-04-23 PROCEDURE — 85027 COMPLETE CBC AUTOMATED: CPT

## 2022-04-23 PROCEDURE — 6370000000 HC RX 637 (ALT 250 FOR IP): Performed by: INTERNAL MEDICINE

## 2022-04-23 PROCEDURE — 92610 EVALUATE SWALLOWING FUNCTION: CPT

## 2022-04-23 PROCEDURE — 80061 LIPID PANEL: CPT

## 2022-04-23 PROCEDURE — G0378 HOSPITAL OBSERVATION PER HR: HCPCS

## 2022-04-23 PROCEDURE — 83036 HEMOGLOBIN GLYCOSYLATED A1C: CPT

## 2022-04-23 PROCEDURE — 80048 BASIC METABOLIC PNL TOTAL CA: CPT

## 2022-04-23 RX ORDER — ASPIRIN 81 MG/1
81 TABLET ORAL DAILY
Qty: 30 TABLET | Refills: 3 | Status: SHIPPED | OUTPATIENT
Start: 2022-04-24

## 2022-04-23 RX ADMIN — SODIUM CHLORIDE, PRESERVATIVE FREE 5 ML: 5 INJECTION INTRAVENOUS at 08:24

## 2022-04-23 RX ADMIN — ASPIRIN 81 MG: 81 TABLET, COATED ORAL at 08:24

## 2022-04-23 ASSESSMENT — PAIN SCALES - GENERAL: PAINLEVEL_OUTOF10: 0

## 2022-04-23 NOTE — PLAN OF CARE
Problem: Discharge Planning  Goal: Discharge to home or other facility with appropriate resources  4/23/2022 1247 by Juno Montero RN  Outcome: Progressing  4/23/2022 0036 by Yuri Martinez RN  Outcome: Progressing

## 2022-04-23 NOTE — PROGRESS NOTES
Speech Language Pathology  Facility/Department: [facility] 4E   CLINICAL BEDSIDE SWALLOW EVALUATION    NAME: Marina Vanessa  : 1945  MRN: [id number]    ADMISSION DATE: 2022  ADMITTING DIAGNOSIS: has Mixed hyperlipidemia; Osteopenia; Sacral insufficiency fracture; and TIA (transient ischemic attack) on their problem list.      Impressions: Marina Vanessa was seen for a bedside swallowing evaluation after being admitted to [facility] with a possible TIA. Pt was alert, cooperative and oriented x4 throughout assessment. She reports that her word finding difficulty has resolved since admission. Relevant medical hx includes hypertension and CVA. Pt denies any hx of dysphagia. For today's assessment pt was positioned upright in bed and presented with a clear vocal quality and strong volitional cough. Oral mechanism examination WFL. Oropharyngeal swallow is intact for trials of regular solids and thin liquids. Swallow initiation appears timely with adequate laryngeal elevation and 0 overt s/s aspiration are observed across all textures. Receptive/expressive language, motor speech and cognition screened informally throughout evaluation and judged to be [facility]. Recommend regular diet/thin liquids with aspiration precautions. No further acute SLP needs were identified at this time.        ONSET DATE: this admission     Date of Eval: 2022  Evaluating Therapist: JOEL Dill    Current Diet level:  Current Diet : Regular      Primary Complaint  Patient Complaint: word finding difficulty- which has resolved    Pain:  Pain Assessment  Pain Assessment: None - Denies Pain  Pain Level: 0  Patient's Stated Pain Goal: 0 - No pain  Pain Location: Head  Pain Orientation: Anterior,Posterior  Pain Descriptors: Dull  Functional Pain Assessment: Activities are not prevented  Pain Type: Acute pain  Non-Pharmaceutical Pain Intervention(s): Emotional support,Family support,Rest  Pain Assessment in Advanced Dementia (PAINAD)  Non-Pharmaceutical Pain Intervention(s): Emotional support,Family support,Rest  Faces, Legs, Activity, Cry, and Consolability (FLACC)  Non-Pharmaceutical Pain Intervention(s): Emotional support,Family support,Rest    Reason for Referral  Beth Linares was referred for a bedside swallow evaluation to assess the efficiency of her swallow function, identify signs and symptoms of aspiration and make recommendations regarding safe dietary consistencies, effective compensatory strategies, and safe eating environment. Impression  Dysphagia Diagnosis: Swallow function appears WFL;No clinical indicators of dysphagia  Dysphagia Outcome Severity Scale: Level 6: Within functional limits/Modified independence     Treatment Plan  Requires SLP Intervention: No  Duration of Treatment: n/a  D/C Recommendations: To be determined       Recommended Diet and Intervention        Recommended Form of Meds: PO          Compensatory Swallowing Strategies  Compensatory Swallowing Strategies : Upright as possible for all oral intake    Treatment/Goals  Short-term Goals  Timeframe for Short-term Goals: n/a    General  Chart Reviewed: Yes  Behavior/Cognition: Alert; Cooperative;Pleasant mood  Respiratory Status: O2 via nasual cannula  Communication Observation: Functional  Follows Directions: Complex  Dentition: Adequate  Patient Positioning: Upright in bed  Baseline Vocal Quality: Normal  Volitional Cough: Strong  Prior Dysphagia History: pt denies  Consistencies Administered: Regular; Thin - cup; Thin - straw           Vision/Hearing  Vision  Vision: Within Functional Limits  Hearing  Hearing: Within functional limits    Oral Motor Deficits  Oral/Motor  Gag: No Impairment    Oral Phase Dysfunction  Oral Phase  Oral Phase: WFL  Oral Phase  Oral Phase - Comment: WFL     Indicators of Pharyngeal Phase Dysfunction   Pharyngeal Phase   Pharyngeal Phase: WFL    Prognosis  Prognosis  Prognosis for safe diet advancement: good  Individuals consulted  Consulted and agree with results and recommendations: Patient; Family member  Family member consulted:  at bedside    Education  Patient Education: recommendations/POC  Patient Education Response: Verbalizes understanding  Safety Devices in place: Yes          Therapy Time  SLP Individual Minutes  Time In: 0930  Time Out: 0320  Minutes: [id number] [address], [address] [facility] [place], 4/23/2022

## 2022-04-23 NOTE — CARE COORDINATION
CM to bedside to initiate DC planning, Pt out of room at this time. CM will revisit. 1222 - This RN CM to bedside to introduce self and initiate DC planning. Pt alert, oriented, and pleasant to speak with. Pt lives with  in 2 story home. No difficulty navigating home per pt. Pt independent of ADLs. Pt does not use any DME or have any [place] [id number] services. Pt has PCP and insurance. Pt denies any DC needs at this time. Please consult CM if any new needs arise.

## 2022-04-23 NOTE — PROGRESS NOTES
Occupational Therapy    Pt declined therapy needs at this time, reported all s/s resolved. Pt reports functioning at baseline level for ADLs and transfers/ambulation, d/c therapy orders. Re-order as needed.     Cinda Mosley, OTR

## 2022-04-24 NOTE — DISCHARGE SUMMARY
[facility]    [facility] Demographics    Patient. Capo Soto  Date of Birth. 1945  MRN. [id number]     Primary care provider. ABE Scanlon Caro, CNP  (Tel: [phone])    Admit date: 4/22/2022    Discharge date (blank if same as Note Date): 4/23/2022  Note Date: 4/24/2022     Reason for Hospitalization. Chief Complaint   Patient presents with    Aphasia     Started 2 hours ago    Headache         Diagnosis. Principal Problem:    TIA (transient ischemic attack)  Resolved Problems:    * No resolved hospital problems. [facility] FACILITY Course:  Admitted with dysarthria, CTA head and neck did not show any stenosis, CT head negative  Aspirin and Lipitor started    As needed hydralazine  Assess ambulation  MRI brain did not show any acute stroke  Was discharged home    Incidental finding of multinodular goiter, will need thyroid ultrasound as an outpatient  PCP follow-up      Invasive procedures and treatments. 1. None     Consults. IP CONSULT TO PHARMACY  PHARMACY TO CHANGE BASE FLUIDS  IP CONSULT TO HOSPITALIST  IP CONSULT TO NEUROLOGY    Physical examination on discharge day. BP (!) 144/56   Pulse 63   Temp 98.5 °F (36.9 °C) (Oral)   Resp 16   Ht 5' 1\" (1.549 m)   Wt 120 lb (54.4 kg)   SpO2 94%   BMI 22.67 kg/m²   General appearance. Alert. Looks comfortable. HEENT. Sclera clear. Moist mucus membranes. Cardiovascular. Regular rate and rhythm, normal S1, S2. No murmur. Respiratory. Not using accessory muscles. Clear to auscultation bilaterally, no wheeze. Gastrointestinal. Abdomen soft, non-tender, not distended, normal bowel sounds  Neurology. Facial symmetry. No speech deficits. Moving all extremities equally. Extremities. No edema in lower extremities. Skin.  Warm, dry, normal turgor    Condition at time of discharge stable    Medication instructions provided to patient at discharge. Medication List      START taking these medications    aspirin 81 MG EC tablet  Take 1 tablet by mouth daily        CHANGE how you take these medications    folic acid-pyridoxine-cyancobalamin 2.5-25-2 MG Tabs  Commonly known as: Folbic  Take 1 tablet by mouth daily  What changed: Another medication with the same name was removed. Continue taking this medication, and follow the directions you see here. CONTINUE taking these medications    hydrocortisone 0.2 % cream  Commonly known as: WESTCORT  Apply twice daily as needed        STOP taking these medications    Biotin 1000 MCG Tabs     CITRACAL + D PO     gabapentin 300 MG capsule  Commonly known as: NEURONTIN     Garlic 5969 MG Caps     OMEGA-3 FISH OIL PO     ONE-A-DAY 50 PLUS PO     risedronate 35 MG tablet  Commonly known as: ACTONEL           Where to Get Your Medications      These medications were sent to [address] [place], [place] [id number]  [place] [address], [address]    Phone: [phone]   · aspirin 81 MG EC tablet         Discharge recommendations given to patient. Follow Up. PCP in 1 week   Disposition. home  Activity. As tolerated  Diet: No diet orders on file      Spent 24 minutes in discharge process.     Signed:  Hanna Machado MD     4/24/2022 10:57 AM

## 2024-08-07 ENCOUNTER — HOSPITAL ENCOUNTER (OUTPATIENT)
Dept: WOMENS IMAGING | Age: 79
Discharge: HOME | End: 2024-08-07
Payer: MEDICARE

## 2024-08-07 DIAGNOSIS — M81.0 AGE-RELATED OSTEOPOROSIS WITHOUT CURRENT PATHOLOGICAL FRACTURE: ICD-10-CM

## 2024-08-07 PROCEDURE — 77080 DXA BONE DENSITY AXIAL: CPT

## 2025-04-21 DIAGNOSIS — M81.8 OSTEOPOROSIS, IDIOPATHIC: Primary | ICD-10-CM

## 2025-04-21 RX ORDER — SODIUM CHLORIDE 0.9 % (FLUSH) 0.9 %
5-40 SYRINGE (ML) INJECTION PRN
OUTPATIENT
Start: 2025-04-28

## 2025-04-21 RX ORDER — ZOLEDRONIC ACID 0.05 MG/ML
5 INJECTION, SOLUTION INTRAVENOUS ONCE
OUTPATIENT
Start: 2025-04-28 | End: 2025-04-28

## 2025-04-22 ENCOUNTER — TELEPHONE (OUTPATIENT)
Dept: INFUSION THERAPY | Age: 80
End: 2025-04-22

## 2025-09-05 ENCOUNTER — HOSPITAL ENCOUNTER (OUTPATIENT)
Dept: PHYSICAL THERAPY | Age: 80
Setting detail: THERAPIES SERIES
Discharge: HOME | End: 2025-09-05
Payer: MEDICARE

## 2025-09-05 PROCEDURE — 97110 THERAPEUTIC EXERCISES: CPT

## 2025-09-05 PROCEDURE — 97161 PT EVAL LOW COMPLEX 20 MIN: CPT
